# Patient Record
Sex: FEMALE | Race: WHITE | Employment: OTHER | URBAN - METROPOLITAN AREA
[De-identification: names, ages, dates, MRNs, and addresses within clinical notes are randomized per-mention and may not be internally consistent; named-entity substitution may affect disease eponyms.]

---

## 2017-04-04 ENCOUNTER — HOSPITAL ENCOUNTER (OUTPATIENT)
Dept: NON INVASIVE DIAGNOSTICS | Age: 82
Discharge: HOME | End: 2017-04-04
Payer: MEDICARE

## 2017-04-04 DIAGNOSIS — I35.0 NONRHEUMATIC AORTIC VALVE STENOSIS: ICD-10-CM

## 2017-04-04 LAB
LV EF: 73 %
LVEF MODALITY: NORMAL

## 2017-04-04 PROCEDURE — 93306 TTE W/DOPPLER COMPLETE: CPT

## 2017-07-06 ENCOUNTER — HOSPITAL ENCOUNTER (OUTPATIENT)
Age: 82
Setting detail: OUTPATIENT SURGERY
Discharge: HOME | End: 2017-07-06
Attending: SURGERY | Admitting: SURGERY
Payer: MEDICARE

## 2017-07-06 ENCOUNTER — ANESTHESIA EVENT (OUTPATIENT)
Dept: OPERATING ROOM | Age: 82
End: 2017-07-06
Payer: MEDICARE

## 2017-07-06 ENCOUNTER — ANESTHESIA (OUTPATIENT)
Dept: OPERATING ROOM | Age: 82
End: 2017-07-06
Payer: MEDICARE

## 2017-07-06 VITALS
BODY MASS INDEX: 30.23 KG/M2 | SYSTOLIC BLOOD PRESSURE: 167 MMHG | OXYGEN SATURATION: 95 % | WEIGHT: 154 LBS | HEIGHT: 60 IN | TEMPERATURE: 96.9 F | HEART RATE: 84 BPM | DIASTOLIC BLOOD PRESSURE: 6 MMHG | RESPIRATION RATE: 16 BRPM

## 2017-07-06 VITALS — SYSTOLIC BLOOD PRESSURE: 170 MMHG | DIASTOLIC BLOOD PRESSURE: 72 MMHG | OXYGEN SATURATION: 98 %

## 2017-07-06 PROCEDURE — 3700000000 HC ANESTHESIA ATTENDED CARE: Performed by: SURGERY

## 2017-07-06 PROCEDURE — 7100000001 HC PACU RECOVERY - ADDTL 15 MIN: Performed by: SURGERY

## 2017-07-06 PROCEDURE — 7100000031 HC ASPR PHASE II RECOVERY - ADDTL 15 MIN: Performed by: SURGERY

## 2017-07-06 PROCEDURE — 88305 TISSUE EXAM BY PATHOLOGIST: CPT

## 2017-07-06 PROCEDURE — 2580000003 HC RX 258: Performed by: ANESTHESIOLOGY

## 2017-07-06 PROCEDURE — 7100000000 HC PACU RECOVERY - FIRST 15 MIN: Performed by: SURGERY

## 2017-07-06 PROCEDURE — 88342 IMHCHEM/IMCYTCHM 1ST ANTB: CPT

## 2017-07-06 PROCEDURE — 3609012400 HC EGD TRANSORAL BIOPSY SINGLE/MULTIPLE: Performed by: SURGERY

## 2017-07-06 PROCEDURE — 3700000001 HC ADD 15 MINUTES (ANESTHESIA): Performed by: SURGERY

## 2017-07-06 PROCEDURE — 2580000003 HC RX 258: Performed by: NURSE ANESTHETIST, CERTIFIED REGISTERED

## 2017-07-06 PROCEDURE — 2500000003 HC RX 250 WO HCPCS: Performed by: NURSE ANESTHETIST, CERTIFIED REGISTERED

## 2017-07-06 PROCEDURE — 7100000030 HC ASPR PHASE II RECOVERY - FIRST 15 MIN: Performed by: SURGERY

## 2017-07-06 PROCEDURE — 6360000002 HC RX W HCPCS: Performed by: NURSE ANESTHETIST, CERTIFIED REGISTERED

## 2017-07-06 RX ORDER — SODIUM CHLORIDE, SODIUM LACTATE, POTASSIUM CHLORIDE, CALCIUM CHLORIDE 600; 310; 30; 20 MG/100ML; MG/100ML; MG/100ML; MG/100ML
INJECTION, SOLUTION INTRAVENOUS CONTINUOUS PRN
Status: DISCONTINUED | OUTPATIENT
Start: 2017-07-06 | End: 2017-07-06 | Stop reason: SDUPTHER

## 2017-07-06 RX ORDER — LIDOCAINE HYDROCHLORIDE 10 MG/ML
INJECTION, SOLUTION EPIDURAL; INFILTRATION; INTRACAUDAL; PERINEURAL PRN
Status: DISCONTINUED | OUTPATIENT
Start: 2017-07-06 | End: 2017-07-06 | Stop reason: SDUPTHER

## 2017-07-06 RX ORDER — OXYCODONE HYDROCHLORIDE AND ACETAMINOPHEN 5; 325 MG/1; MG/1
2 TABLET ORAL PRN
Status: DISCONTINUED | OUTPATIENT
Start: 2017-07-06 | End: 2017-07-06 | Stop reason: HOSPADM

## 2017-07-06 RX ORDER — SODIUM CHLORIDE, SODIUM LACTATE, POTASSIUM CHLORIDE, CALCIUM CHLORIDE 600; 310; 30; 20 MG/100ML; MG/100ML; MG/100ML; MG/100ML
INJECTION, SOLUTION INTRAVENOUS CONTINUOUS
Status: DISCONTINUED | OUTPATIENT
Start: 2017-07-06 | End: 2017-07-06 | Stop reason: HOSPADM

## 2017-07-06 RX ORDER — OXYCODONE HYDROCHLORIDE AND ACETAMINOPHEN 5; 325 MG/1; MG/1
1 TABLET ORAL PRN
Status: DISCONTINUED | OUTPATIENT
Start: 2017-07-06 | End: 2017-07-06 | Stop reason: HOSPADM

## 2017-07-06 RX ORDER — DIPHENHYDRAMINE HYDROCHLORIDE 50 MG/ML
12.5 INJECTION INTRAMUSCULAR; INTRAVENOUS
Status: DISCONTINUED | OUTPATIENT
Start: 2017-07-06 | End: 2017-07-06 | Stop reason: HOSPADM

## 2017-07-06 RX ORDER — PROMETHAZINE HYDROCHLORIDE 25 MG/ML
6.25 INJECTION, SOLUTION INTRAMUSCULAR; INTRAVENOUS
Status: DISCONTINUED | OUTPATIENT
Start: 2017-07-06 | End: 2017-07-06 | Stop reason: HOSPADM

## 2017-07-06 RX ORDER — MEPERIDINE HYDROCHLORIDE 25 MG/ML
12.5 INJECTION INTRAMUSCULAR; INTRAVENOUS; SUBCUTANEOUS EVERY 5 MIN PRN
Status: DISCONTINUED | OUTPATIENT
Start: 2017-07-06 | End: 2017-07-06 | Stop reason: HOSPADM

## 2017-07-06 RX ORDER — PROPOFOL 10 MG/ML
INJECTION, EMULSION INTRAVENOUS CONTINUOUS PRN
Status: DISCONTINUED | OUTPATIENT
Start: 2017-07-06 | End: 2017-07-06 | Stop reason: SDUPTHER

## 2017-07-06 RX ORDER — LABETALOL HYDROCHLORIDE 5 MG/ML
5 INJECTION, SOLUTION INTRAVENOUS EVERY 10 MIN PRN
Status: DISCONTINUED | OUTPATIENT
Start: 2017-07-06 | End: 2017-07-06 | Stop reason: HOSPADM

## 2017-07-06 RX ADMIN — SODIUM CHLORIDE, POTASSIUM CHLORIDE, SODIUM LACTATE AND CALCIUM CHLORIDE: 600; 310; 30; 20 INJECTION, SOLUTION INTRAVENOUS at 10:28

## 2017-07-06 RX ADMIN — PROPOFOL 70 MCG/KG/MIN: 10 INJECTION, EMULSION INTRAVENOUS at 10:50

## 2017-07-06 RX ADMIN — LIDOCAINE HYDROCHLORIDE 50 MG: 10 INJECTION, SOLUTION EPIDURAL; INFILTRATION; INTRACAUDAL; PERINEURAL at 10:50

## 2017-07-06 RX ADMIN — SODIUM CHLORIDE, POTASSIUM CHLORIDE, SODIUM LACTATE AND CALCIUM CHLORIDE: 600; 310; 30; 20 INJECTION, SOLUTION INTRAVENOUS at 10:44

## 2017-07-06 ASSESSMENT — PAIN SCALES - GENERAL
PAINLEVEL_OUTOF10: 0

## 2017-07-06 ASSESSMENT — PAIN - FUNCTIONAL ASSESSMENT: PAIN_FUNCTIONAL_ASSESSMENT: 0-10

## 2017-07-07 LAB — SURGICAL PATHOLOGY REPORT: NORMAL

## 2017-10-12 ENCOUNTER — APPOINTMENT (OUTPATIENT)
Dept: GENERAL RADIOLOGY | Age: 82
End: 2017-10-12
Payer: MEDICARE

## 2017-10-12 ENCOUNTER — APPOINTMENT (OUTPATIENT)
Dept: CT IMAGING | Age: 82
End: 2017-10-12
Payer: MEDICARE

## 2017-10-12 ENCOUNTER — HOSPITAL ENCOUNTER (EMERGENCY)
Age: 82
Discharge: HOME | End: 2017-10-12
Attending: EMERGENCY MEDICINE
Payer: MEDICARE

## 2017-10-12 VITALS
HEIGHT: 60 IN | WEIGHT: 155 LBS | OXYGEN SATURATION: 95 % | BODY MASS INDEX: 30.43 KG/M2 | DIASTOLIC BLOOD PRESSURE: 76 MMHG | RESPIRATION RATE: 16 BRPM | SYSTOLIC BLOOD PRESSURE: 185 MMHG | HEART RATE: 82 BPM | TEMPERATURE: 97.7 F

## 2017-10-12 DIAGNOSIS — S01.81XA FACIAL LACERATION, INITIAL ENCOUNTER: ICD-10-CM

## 2017-10-12 DIAGNOSIS — S80.02XA CONTUSION OF LEFT KNEE, INITIAL ENCOUNTER: Primary | ICD-10-CM

## 2017-10-12 DIAGNOSIS — S90.32XA CONTUSION OF LEFT FOOT, INITIAL ENCOUNTER: ICD-10-CM

## 2017-10-12 DIAGNOSIS — S09.90XA HEAD INJURY, INITIAL ENCOUNTER: ICD-10-CM

## 2017-10-12 PROCEDURE — 73060 X-RAY EXAM OF HUMERUS: CPT

## 2017-10-12 PROCEDURE — 99284 EMERGENCY DEPT VISIT MOD MDM: CPT

## 2017-10-12 PROCEDURE — 73562 X-RAY EXAM OF KNEE 3: CPT

## 2017-10-12 PROCEDURE — 73630 X-RAY EXAM OF FOOT: CPT

## 2017-10-12 PROCEDURE — 70450 CT HEAD/BRAIN W/O DYE: CPT

## 2017-10-12 ASSESSMENT — ENCOUNTER SYMPTOMS
COLOR CHANGE: 0
VOMITING: 0
BACK PAIN: 0
RHINORRHEA: 0
EYE REDNESS: 0
NAUSEA: 0
SORE THROAT: 0
EYE DISCHARGE: 0
SHORTNESS OF BREATH: 0
DIARRHEA: 0
COUGH: 0

## 2017-10-12 ASSESSMENT — PAIN DESCRIPTION - LOCATION: LOCATION: LEG

## 2017-10-12 ASSESSMENT — PAIN DESCRIPTION - PAIN TYPE: TYPE: ACUTE PAIN

## 2017-10-12 ASSESSMENT — PAIN DESCRIPTION - ORIENTATION: ORIENTATION: LEFT

## 2017-10-12 ASSESSMENT — PAIN SCALES - GENERAL: PAINLEVEL_OUTOF10: 6

## 2017-10-12 NOTE — ED PROVIDER NOTES
[facility]  eMERGENCY dEPARTMENT eNCOUnter      Pt Name: Kassie Hill  MRN: [id number]  [place] 5/5/1927  Date of evaluation: 10/12/2017    CHIEF COMPLAINT       Chief Complaint   Patient presents with    Fall    Knee Pain     left knee    Foot Pain     left foot          HISTORY OF PRESENT ILLNESS    Kassie Hill is a 80 y.o. female who presents With complaints of a fall, small laceration to the left side of her head and pain in the left knee and left foot. Patient states that she was on a trip this evening, she was walking she caught her foot on a step and she tripped and fell, hitting her head on a table. She denies any loss of consciousness but did sustain a small laceration to the left forehead. She also complains of severe pain in the left knee and left foot as well as some pain in the right humerus. Patient describes her symptoms as severe. She denies any nausea or vomiting, has no headache, denies any blood thinner use. She described her symptoms as severe. REVIEW OF SYSTEMS         Review of Systems   Constitutional: Negative for chills and fever. HENT: Negative for rhinorrhea and sore throat. Eyes: Negative for discharge, redness and visual disturbance. Respiratory: Negative for cough and shortness of breath. Cardiovascular: Negative for chest pain, palpitations and leg swelling. Gastrointestinal: Negative for diarrhea, nausea and vomiting. Genitourinary: Negative for dysuria and hematuria. Musculoskeletal: Negative for arthralgias, back pain, myalgias and neck pain. Right knee pain, left knee pain, left foot pain   Skin: Positive for wound. Negative for color change and rash. Neurological: Negative for seizures, weakness and headaches. Psychiatric/Behavioral: Negative for hallucinations, self-injury and suicidal ideas.           PAST MEDICAL HISTORY    has a past medical history of Anemia; Blood transfusion reaction; Diverticulosis; Edema of status of her brother is unknown. She indicated that the status of her other is unknown.      family history includes Heart Disease in an other family member; Hypertension in her brother; Other in her father. SOCIAL HISTORY      reports that she has never smoked. She has never used smokeless tobacco. She reports that she drinks alcohol. She reports that she does not use drugs. PHYSICAL EXAM     INITIAL VITALS:  height is 5' (1.524 m) and weight is 155 lb (70.3 kg). Her oral temperature is 97.7 °F (36.5 °C). Her blood pressure is 185/76 (abnormal) and her pulse is 82. Her respiration is 16 and oxygen saturation is 95%. Physical Exam   Constitutional: She is oriented to person, place, and time. She appears well-developed and well-nourished. Non-toxic appearance. She does not appear ill. HENT:   Head: Normocephalic. Head is with contusion and with laceration. Head is without Botello's sign. Right Ear: Tympanic membrane normal.   Eyes: Conjunctivae and EOM are normal. Pupils are equal, round, and reactive to light. Neck: Trachea normal and normal range of motion. Neck supple. No spinous process tenderness and no muscular tenderness present. Cardiovascular: Normal rate, regular rhythm, S1 normal and S2 normal.    No murmur heard. Pulmonary/Chest: Effort normal and breath sounds normal. No accessory muscle usage. No respiratory distress. She exhibits no tenderness and no deformity. Abdominal: Normal appearance and bowel sounds are normal. She exhibits no distension, no abdominal bruit and no ascites. There is no tenderness. There is no rigidity, no rebound and no guarding. Musculoskeletal:        Right knee: She exhibits normal range of motion, no swelling, no deformity and no laceration. Left knee: She exhibits decreased range of motion, swelling, effusion and bony tenderness. She exhibits no deformity and no laceration. Tenderness found.  Medial joint line and lateral joint line tenderness noted. Legs:  Neurological: She is alert and oriented to person, place, and time. She has normal strength. No cranial nerve deficit or sensory deficit. GCS eye subscore is 4. GCS verbal subscore is 5. GCS motor subscore is 6. Skin: Skin is warm. No rash noted. Psychiatric: She has a normal mood and affect. Her speech is normal.       DIFFERENTIAL DIAGNOSIS/MDM:   49-year-old female presents with left and right knee pain, left foot pain and a minor skin tear to the left face, we will obtain CT brain, x-rays of the knees bilaterally, the left foot and an x-ray of the right humerus. DIAGNOSTIC RESULTS     EKG: All EKG's are interpreted by the Emergency Department Physician who either signs or Co-signs this chart in the absence of a cardiologist.    Not indicated    RADIOLOGY:   I directly visualized the following  images and reviewed the radiologist interpretations:  XR HUMERUS RIGHT (MIN 2 VIEWS)   Final Result   No acute findings. XR KNEE LEFT (3 VIEWS)   Final Result   Advanced osteoarthrosis. No acute findings. XR KNEE RIGHT (3 VIEWS)   Final Result   No acute abnormality of the knee. XR FOOT LEFT (MIN 3 VIEWS)   Final Result   No acute osseous abnormality. CT Head WO Contrast   Final Result   No acute intracranial abnormality. Senescent changes. LABS:  Labs Reviewed - No data to display          EMERGENCY DEPARTMENT COURSE:   Vitals:    Vitals:    10/12/17 0042   BP: (!) 185/76   Pulse: 82   Resp: 16   Temp: 97.7 °F (36.5 °C)   TempSrc: Oral   SpO2: 95%   Weight: 155 lb (70.3 kg)   Height: 5' (1.524 m)     2:43 AM  Patient x rays and CT negative, Patient was ambulated around the emergency department with a 4. Cane and she did well. We will attempt to provide her with a new cane prescription, outpatient follow-up with primary care and return if symptoms worsen or change.     CRITICAL CARE:      CONSULTS:  None      PROCEDURES:      FINAL IMPRESSION      1. Contusion of left knee, initial encounter    2. Contusion of left foot, initial encounter    3. Head injury, initial encounter    4.  Facial laceration, initial encounter          DISPOSITION/PLAN   DISPOSITION     DC home    PATIENT REFERRED TO:  Waqas Ray [address] [place] [address]  [phone]    Schedule an appointment as soon as possible for a visit in 1 day        DISCHARGE MEDICATIONS:  New Prescriptions    No medications on file       (Please note that portions of this note were completed with a voice recognition program.  Efforts were made to edit the dictations but occasionally words are mis-transcribed.)    Karla Lovell  Emergency Medicine                 Karla Lovell MD  10/12/17 1705

## 2017-10-12 NOTE — ED NOTES
Pt presents to ED with complaint of falling while walking down stairs on a bus trip in [place] this evening. Pt denies any LOC, pt does have small skin tear to left temple and some slight swelling to right upper lip. Pt has large area of bruising to right upper arm. Left knee is swollen to approx 2x size of right knee, pt rates pain 6/10 at rest and 10/10 when standing. Pt unable to freely bend left knee. Pt also complaining of pain to top of left foot. Pt is AOx4 and answering questions appropriately. Pt denies taking any blood thinners at home, son is with pt at bedside.       Eladia Melo RN  10/12/17 2179

## 2017-10-13 ENCOUNTER — HOSPITAL ENCOUNTER (EMERGENCY)
Age: 82
Discharge: HOME | End: 2017-10-13
Attending: EMERGENCY MEDICINE
Payer: MEDICARE

## 2017-10-13 ENCOUNTER — APPOINTMENT (OUTPATIENT)
Dept: CT IMAGING | Age: 82
End: 2017-10-13
Payer: MEDICARE

## 2017-10-13 VITALS
DIASTOLIC BLOOD PRESSURE: 87 MMHG | SYSTOLIC BLOOD PRESSURE: 154 MMHG | BODY MASS INDEX: 29.45 KG/M2 | RESPIRATION RATE: 16 BRPM | OXYGEN SATURATION: 95 % | WEIGHT: 150 LBS | HEART RATE: 83 BPM | TEMPERATURE: 98.2 F | HEIGHT: 60 IN

## 2017-10-13 DIAGNOSIS — S80.02XA CONTUSION OF LEFT KNEE, INITIAL ENCOUNTER: Primary | ICD-10-CM

## 2017-10-13 PROCEDURE — 73700 CT LOWER EXTREMITY W/O DYE: CPT

## 2017-10-13 PROCEDURE — 6370000000 HC RX 637 (ALT 250 FOR IP): Performed by: EMERGENCY MEDICINE

## 2017-10-13 PROCEDURE — 99284 EMERGENCY DEPT VISIT MOD MDM: CPT

## 2017-10-13 RX ORDER — HYDROCODONE BITARTRATE AND ACETAMINOPHEN 5; 325 MG/1; MG/1
1 TABLET ORAL ONCE
Status: COMPLETED | OUTPATIENT
Start: 2017-10-13 | End: 2017-10-13

## 2017-10-13 RX ORDER — HYDROCODONE BITARTRATE AND ACETAMINOPHEN 5; 325 MG/1; MG/1
1 TABLET ORAL EVERY 6 HOURS PRN
Qty: 10 TABLET | Refills: 0 | Status: SHIPPED | OUTPATIENT
Start: 2017-10-13 | End: 2017-10-20

## 2017-10-13 RX ADMIN — HYDROCODONE BITARTRATE AND ACETAMINOPHEN 1 TABLET: 5; 325 TABLET ORAL at 11:03

## 2017-10-13 ASSESSMENT — ENCOUNTER SYMPTOMS
ABDOMINAL PAIN: 0
EYE REDNESS: 0
SHORTNESS OF BREATH: 0
BACK PAIN: 0
COUGH: 0
RHINORRHEA: 0
EYE PAIN: 0
VOMITING: 0
NAUSEA: 0

## 2017-10-13 ASSESSMENT — PAIN DESCRIPTION - DESCRIPTORS: DESCRIPTORS: CONSTANT

## 2017-10-13 ASSESSMENT — PAIN DESCRIPTION - LOCATION: LOCATION: KNEE

## 2017-10-13 ASSESSMENT — PAIN SCALES - GENERAL
PAINLEVEL_OUTOF10: 10
PAINLEVEL_OUTOF10: 10

## 2017-10-13 ASSESSMENT — PAIN DESCRIPTION - PAIN TYPE: TYPE: ACUTE PAIN

## 2017-10-13 ASSESSMENT — PAIN DESCRIPTION - ORIENTATION: ORIENTATION: LEFT

## 2017-10-13 NOTE — ED NOTES
Pt requests placement in ecf at [facility] of [address] [place] [address]. [address] [place] [address] contacted with paperwork faxed. Pt admiited to ecf, discharged from ed and transported to [address] [place] [address] per son in private vehicle.       Rosy Clinton RN  10/13/17 9190

## 2017-10-13 NOTE — ED PROVIDER NOTES
SENNOSIDES-DOCUSATE SODIUM (SENOKOT-S) 8.6-50 MG TABLET    Take 2 tablets by mouth nightly as needed for Constipation. ALLERGIES     Ace inhibitors and Sporanox [itraconazole]    FAMILY HISTORY       Family Status   Relation Status    Father     Brother     Other       Family History   Problem Relation Age of Onset    Other Father      kidney dz, bright`s dz    Hypertension Brother      2 brothers. heart dz    Heart Disease Other        SOCIAL HISTORY       Social History     Social History    Marital status:      Spouse name: N/A    Number of children: N/A    Years of education: N/A     Occupational History    retired      Social History Main Topics    Smoking status: Never Smoker    Smokeless tobacco: Never Used    Alcohol use Yes      Comment: occasional    Drug use: No    Sexual activity: Not Asked     Other Topics Concern    None     Social History Narrative    None       PHYSICAL EXAM     INITIAL VITALS:  height is 5' (1.524 m) and weight is 150 lb (68 kg). Her oral temperature is 98.2 °F (36.8 °C). Her blood pressure is 154/87 (abnormal) and her pulse is 83. Her respiration is 16 and oxygen saturation is 95%. Physical Exam   Constitutional: She is oriented to person, place, and time. She appears well-developed and well-nourished. HENT:   Head: Normocephalic. Nose: Nose normal.   Mouth/Throat: Oropharynx is clear and moist.   Old bruising to the left periorbital region   Eyes: Conjunctivae and EOM are normal. Pupils are equal, round, and reactive to light. Neck: Normal range of motion. Neck supple. Cardiovascular: Normal rate, regular rhythm, normal heart sounds and intact distal pulses. Pulmonary/Chest: Effort normal and breath sounds normal.   Abdominal: Soft. Bowel sounds are normal.   Musculoskeletal: Normal range of motion. Left knee with some swelling and tenderness and limited range of motion due to pain. Distal neurovascular motor is intact.   Hips

## 2017-10-13 NOTE — ED TRIAGE NOTES
Pt fell 2 days ago. Pt was evaluated yesterday here. Pt states today L knee hurts so bad she cannot put weight on it. Pt denies SOB or CP.

## 2017-10-13 NOTE — ED NOTES
Bed: 12  Expected date:   Expected time:   Means of arrival: Lety Anderson  Comments:      Mark Hoffman RN  10/13/17 1021

## 2017-10-30 ENCOUNTER — CARE COORDINATION (OUTPATIENT)
Dept: CARE COORDINATION | Age: 82
End: 2017-10-30

## 2017-10-30 NOTE — CARE COORDINATION
I called and spoke with  Ale Herrera. She will be discharged from [facility] tomorrow with [facility]. I asked her to fax the [facility] list to [facility] at [phone]. I will call Alessio Irvin in a few days to follow.

## 2017-11-07 ENCOUNTER — CARE COORDINATION (OUTPATIENT)
Dept: CARE COORDINATION | Age: 82
End: 2017-11-07

## 2017-11-07 NOTE — CARE COORDINATION
Ambulatory Care Coordination Note  11/7/2017  CM Risk Score: 4  Donny Mortality Risk Score: 25.37    ACC: Romina Whiteside, EDU    Summary Note:  Called and left a voicemail message asking patient or family member to call me back at [phone] for care coordination call, check on home health visits with Layton Callow since her discharge from [facility] on 10/31/17. I called and spoke with Heather Carter at [facility]. Alexia Tam is receiving nursing, PT, OT and HHA visits. She lives by herself. She manages her medications by herself, has not had any falls since being home. Alexia Tam called back. She is doing well, continues to use her walker, feels safer that way and faster than hanging onto furniture. She has no pain any more, feels good, is not taking any pain medication. Is getting visits about twice a day from therapists, [facility] and nurse. She gets Meals on Wheels. Her son lives in [facility], he checks on her every day by phone or visits her. Full medication review not completed, stated is on the same meds as before. Denied any needs. I encouraged her to call me or call her home health nurse for any problems or concerns. General Assessment    Do you have any symptoms that are causing concern?:  No                 Care Coordination Interventions    Program Enrollment:  Rising Risk  Referral from Primary Care Provider:  Yes  Suggested Interventions and Community Resources         Goals Addressed     None          Prior to Admission medications    Medication Sig Start Date End Date Taking? Authorizing Provider   diclofenac sodium 1 % GEL Apply 2 g topically 2 times daily    Historical Provider, MD   HYDROcodone-acetaminophen (NORCO) 5-325 MG per tablet Take 1 tablet by mouth every 6 hours as needed for Pain . Historical Provider, MD   HYDROcodone-acetaminophen (NORCO)  MG per tablet Take 1 tablet by mouth every 6 hours as needed for Pain .     Historical Provider, MD   Cranberry-Vitamin C-Inulin (UTI-STAT) LIQD Take by mouth Historical Provider, MD   levothyroxine (SYNTHROID) 25 MCG tablet Take 1 tablet by mouth Daily 10/25/17   Stefanie West MD   losartan (COZAAR) 100 MG tablet Take 1 tablet by mouth daily 10/25/17   Stefanie West MD   amLODIPine (NORVASC) 5 MG tablet Take 1 tablet by mouth daily 10/25/17   Stefanie West MD   b complex vitamins capsule Take 1 capsule by mouth daily    Historical Provider, MD   acetaminophen 650 MG TABS Take 650 mg by mouth every 4 hours as needed. 7/9/14   Stefanie West MD   sennosides-docusate sodium (SENOKOT-S) 8.6-50 MG tablet Take 2 tablets by mouth nightly as needed for Constipation. 6/6/14   Stefanie West MD   melatonin 3 MG TABS tablet Take 3 mg by mouth nightly. Historical Provider, MD   multivitamin (OCUVITE) TABS per tablet Take 1 tablet by mouth daily.     Historical Provider, MD       Future Appointments  Date Time Provider Jame Delgado   12/1/2017 1:20 PM Stefanie West MD [facility] [address] [place] [address]

## 2017-11-14 ENCOUNTER — CARE COORDINATION (OUTPATIENT)
Dept: CARE COORDINATION | Age: 82
End: 2017-11-14

## 2017-12-08 ENCOUNTER — CARE COORDINATION (OUTPATIENT)
Dept: CARE COORDINATION | Age: 82
End: 2017-12-08

## 2017-12-08 NOTE — CARE COORDINATION
Ambulatory Care Coordination Note  12/8/2017  CM Risk Score: 4  Donny Mortality Risk Score: 25.37    ACC: Adryan Moraes, RN    Summary Note: [facility] [place] is feeling well. Sometimes gets uncomfortable from left leg injury but is able to walk ok, she just can't tolerate a lot of walking at one time, she has to take frequent breaks. No falls since she's been home. No shortness of breath or other pain, is eating ok. She was discharged from [facility] and therapy visits, is trying to continue exercises at home she learned from physical therapist. Had recent PCP appt, no new medications. Denied any needs at this time. General Assessment    Do you have any symptoms that are causing concern?:  No               Care Coordination Interventions    Program Enrollment:  Rising Risk  Referral from Primary Care Provider:  Yes  Suggested Interventions and Community Resources  Fall Risk Prevention: In Process  Home Health Services:  (Comment:  )  Meals on Wheels:  Completed  Occupational Therapy:  (Comment:  )  Physical Therapy:  (Comment:  )         Goals Addressed             Most Recent     Reduce Falls    On track (12/8/2017)             I will reduce my risk of falls by the following: Remove rugs or use non slip rugs  Install grab bars in bathroom  Use walking aids like cane or walker  Follow through on orders for PT    Barriers: none  Plan for overcoming my barriers: N/A  Confidence: 9/10  Anticipated Goal Completion Date: one month            Prior to Admission medications    Medication Sig Start Date End Date Taking? Authorizing Provider   diclofenac sodium 1 % GEL Apply 2 g topically 2 times daily 11/13/17   Alena Orantes MD   HYDROcodone-acetaminophen (NORCO) 5-325 MG per tablet Take 1 tablet by mouth every 6 hours as needed for Pain . Historical Provider, MD   HYDROcodone-acetaminophen (NORCO)  MG per tablet Take 1 tablet by mouth every 6 hours as needed for Pain .     Historical Provider, MD Cranberry-Vitamin C-Inulin (UTI-STAT) LIQD Take by mouth    Historical Provider, MD   levothyroxine (SYNTHROID) 25 MCG tablet Take 1 tablet by mouth Daily 10/25/17   Agustin Momin MD   losartan (COZAAR) 100 MG tablet Take 1 tablet by mouth daily 10/25/17   Agustin Momin MD   amLODIPine (NORVASC) 5 MG tablet Take 1 tablet by mouth daily 10/25/17   Agustin Momin MD   b complex vitamins capsule Take 1 capsule by mouth daily    Historical Provider, MD   acetaminophen 650 MG TABS Take 650 mg by mouth every 4 hours as needed. 7/9/14   Agustin Momin MD   sennosides-docusate sodium (SENOKOT-S) 8.6-50 MG tablet Take 2 tablets by mouth nightly as needed for Constipation. 6/6/14   Agustin Momin MD   melatonin 3 MG TABS tablet Take 3 mg by mouth nightly. Historical Provider, MD   multivitamin (OCUVITE) TABS per tablet Take 1 tablet by mouth daily.     Historical Provider, MD       Future Appointments  Date Time Provider Jame Delgado   3/8/2018 1:40 PM Agustin Momin MD [facility] [address] [place] [address]

## 2018-01-15 ENCOUNTER — CARE COORDINATION (OUTPATIENT)
Dept: CARE COORDINATION | Age: 83
End: 2018-01-15

## 2018-01-26 ENCOUNTER — CARE COORDINATION (OUTPATIENT)
Dept: CARE COORDINATION | Age: 83
End: 2018-01-26

## 2018-02-15 ENCOUNTER — CARE COORDINATION (OUTPATIENT)
Dept: CARE COORDINATION | Age: 83
End: 2018-02-15

## 2018-02-15 NOTE — CARE COORDINATION
Ambulatory Care Coordination Note  2/15/2018  CM Risk Score: 5  Donny Mortality Risk Score: 25.37    ACC: Nikki Moody RN    Summary Note: She reported she is feeling good. Is now using the Voltaren gel and she likes it. Moving around good, no falls. Denied any other issues or needs at this time. General Assessment    Do you have any symptoms that are causing concern?:  No               Care Coordination Interventions    Program Enrollment:  Rising Risk  Referral from Primary Care Provider:  Yes  Suggested Interventions and Community Resources  Fall Risk Prevention:  Completed  Home Health Services:  (Comment:  )  Meals on Wheels:  Completed  Medi Set or Pill Pack:  Declined  Occupational Therapy:  (Comment:  )  Physical Therapy:  (Comment:  )         Goals Addressed     None          Prior to Admission medications    Medication Sig Start Date End Date Taking? Authorizing Provider   diclofenac sodium 1 % GEL Apply 2 g topically 2 times daily 11/13/17   Isadora Rick MD   HYDROcodone-acetaminophen (NORCO) 5-325 MG per tablet Take 1 tablet by mouth every 6 hours as needed for Pain . Historical Provider, MD   HYDROcodone-acetaminophen (NORCO)  MG per tablet Take 1 tablet by mouth every 6 hours as needed for Pain . Historical Provider, MD   Cranberry-Vitamin C-Inulin (UTI-STAT) LIQD Take by mouth    Historical Provider, MD   levothyroxine (SYNTHROID) 25 MCG tablet Take 1 tablet by mouth Daily 10/25/17   Isadora Rick MD   losartan (COZAAR) 100 MG tablet Take 1 tablet by mouth daily 10/25/17   Isadora Rick MD   amLODIPine (NORVASC) 5 MG tablet Take 1 tablet by mouth daily 10/25/17   Isadora Rick MD   b complex vitamins capsule Take 1 capsule by mouth daily    Historical Provider, MD   acetaminophen 650 MG TABS Take 650 mg by mouth every 4 hours as needed.  7/9/14   Isadora Rick MD   sennosides-docusate sodium (SENOKOT-S) 8.6-50 MG tablet Take 2 tablets by mouth nightly as needed for Constipation. 6/6/14   Pedro Gregory MD   melatonin 3 MG TABS tablet Take 3 mg by mouth nightly. Historical Provider, MD   multivitamin (OCUVITE) TABS per tablet Take 1 tablet by mouth daily.     Historical Provider, MD       Future Appointments  Date Time Provider Jame Sandy   3/8/2018 1:40 PM Pedro Gregory MD [address]

## 2018-03-08 ENCOUNTER — CARE COORDINATION (OUTPATIENT)
Dept: CARE COORDINATION | Age: 83
End: 2018-03-08

## 2018-03-08 ASSESSMENT — PATIENT HEALTH QUESTIONNAIRE - PHQ9
8. MOVING OR SPEAKING SO SLOWLY THAT OTHER PEOPLE COULD HAVE NOTICED. OR THE OPPOSITE, BEING SO FIGETY OR RESTLESS THAT YOU HAVE BEEN MOVING AROUND A LOT MORE THAN USUAL: 0
4. FEELING TIRED OR HAVING LITTLE ENERGY: 0
7. TROUBLE CONCENTRATING ON THINGS, SUCH AS READING THE NEWSPAPER OR WATCHING TELEVISION: 0
5. POOR APPETITE OR OVEREATING: 0
SUM OF ALL RESPONSES TO PHQ9 QUESTIONS 1 & 2: 0
9. THOUGHTS THAT YOU WOULD BE BETTER OFF DEAD, OR OF HURTING YOURSELF: 0
SUM OF ALL RESPONSES TO PHQ QUESTIONS 1-9: 1
3. TROUBLE FALLING OR STAYING ASLEEP: 1
6. FEELING BAD ABOUT YOURSELF - OR THAT YOU ARE A FAILURE OR HAVE LET YOURSELF OR YOUR FAMILY DOWN: 0
10. IF YOU CHECKED OFF ANY PROBLEMS, HOW DIFFICULT HAVE THESE PROBLEMS MADE IT FOR YOU TO DO YOUR WORK, TAKE CARE OF THINGS AT HOME, OR GET ALONG WITH OTHER PEOPLE: 0
1. LITTLE INTEREST OR PLEASURE IN DOING THINGS: 0
2. FEELING DOWN, DEPRESSED OR HOPELESS: 0

## 2018-03-08 NOTE — CARE COORDINATION
Ambulatory Care Coordination Note  3/8/2018  CM Risk Score: 5  Donny Mortality Risk Score: 25.37    ACC: Margarito Corcoran RN    Summary Note: Bubba Fitzpatrick while at office visit. She has been doing well. Is using her cane at all times now, is afraid of another fall, has not had any more. She is feeling good, using the Voltaren gel which helps her pain. She had left carpal tunnel surgery which did not completely resolve her pain issue in left hand, still has some weakness. She wears a splint which helps. No issues or concerns. She will get her yearly labs done soon. Denied any needs right now. General Assessment    Do you have any symptoms that are causing concern?:  Yes  Progression since Onset:  Unchanged  Reported Symptoms:  Pain (Comment: left wrist/hand discomfort from carpal tunnel)               Care Coordination Interventions    Program Enrollment:  Rising Risk  Referral from Primary Care Provider:  Yes  Suggested Interventions and Community Resources  Fall Risk Prevention:  Completed  Home Health Services:  (Comment:  )  Meals on Wheels:  Completed  Medi Set or Pill Pack:  Declined  Occupational Therapy:  (Comment:  )  Physical Therapy:  (Comment:  )         Goals Addressed             Most Recent     Reduce Falls    On track (3/8/2018)             I will reduce my risk of falls by the following: Remove rugs or use non slip rugs  Install grab bars in bathroom  Use walking aids like cane or walker  Follow through on orders for PT    Barriers: none  Plan for overcoming my barriers: N/A  Confidence: 9/10  Anticipated Goal Completion Date: one month            Prior to Admission medications    Medication Sig Start Date End Date Taking?  Authorizing Provider   triamcinolone (KENALOG) 0.1 % ointment Apply 1 each topically 2 times daily 1/19/18   Historical Provider, MD   diclofenac sodium 1 % GEL Apply 2 g topically 2 times daily 11/13/17   Dulce Wilder MD   Cranberry-Vitamin C-Inulin (UTI-STAT) LIQD Take by mouth    Historical Provider, MD   levothyroxine (SYNTHROID) 25 MCG tablet Take 1 tablet by mouth Daily 10/25/17   Aracelis Arcos MD   losartan (COZAAR) 100 MG tablet Take 1 tablet by mouth daily 10/25/17   Aracelis Arcos MD   amLODIPine (NORVASC) 5 MG tablet Take 1 tablet by mouth daily 10/25/17   Aracelis Arcos MD   b complex vitamins capsule Take 1 capsule by mouth daily    Historical Provider, MD   acetaminophen 650 MG TABS Take 650 mg by mouth every 4 hours as needed. 7/9/14   Aracelis Arcos MD   sennosides-docusate sodium (SENOKOT-S) 8.6-50 MG tablet Take 2 tablets by mouth nightly as needed for Constipation. 6/6/14   Aracelis Arcos MD   melatonin 3 MG TABS tablet Take 3 mg by mouth nightly. Historical Provider, MD   multivitamin (OCUVITE) TABS per tablet Take 1 tablet by mouth daily.     Historical Provider, MD       Future Appointments  Date Time Provider Jame Delgado   6/14/2018 1:40 PM Aracelis Arcos MD [facility] [address] [place] [address]

## 2018-04-03 ENCOUNTER — CARE COORDINATION (OUTPATIENT)
Dept: CARE COORDINATION | Age: 83
End: 2018-04-03

## 2018-05-01 ENCOUNTER — CARE COORDINATION (OUTPATIENT)
Dept: CARE COORDINATION | Age: 83
End: 2018-05-01

## 2018-05-02 ENCOUNTER — HOSPITAL ENCOUNTER (EMERGENCY)
Age: 83
Discharge: HOME | End: 2018-05-02
Attending: EMERGENCY MEDICINE
Payer: MEDICARE

## 2018-05-02 ENCOUNTER — APPOINTMENT (OUTPATIENT)
Dept: CT IMAGING | Age: 83
End: 2018-05-02
Payer: MEDICARE

## 2018-05-02 VITALS
BODY MASS INDEX: 28.47 KG/M2 | HEART RATE: 92 BPM | WEIGHT: 145 LBS | HEIGHT: 60 IN | RESPIRATION RATE: 19 BRPM | OXYGEN SATURATION: 93 % | DIASTOLIC BLOOD PRESSURE: 95 MMHG | SYSTOLIC BLOOD PRESSURE: 165 MMHG | TEMPERATURE: 98.7 F

## 2018-05-02 DIAGNOSIS — R19.7 DIARRHEA, UNSPECIFIED TYPE: Primary | ICD-10-CM

## 2018-05-02 LAB
ABSOLUTE EOS #: 0 K/UL (ref 0–0.4)
ABSOLUTE IMMATURE GRANULOCYTE: ABNORMAL K/UL (ref 0–0.3)
ABSOLUTE LYMPH #: 0.5 K/UL (ref 1–4.8)
ABSOLUTE MONO #: 0.8 K/UL (ref 0.1–1.3)
ALBUMIN SERPL-MCNC: 3.7 G/DL (ref 3.5–5.2)
ALBUMIN/GLOBULIN RATIO: ABNORMAL (ref 1–2.5)
ALP BLD-CCNC: 56 U/L (ref 35–104)
ALT SERPL-CCNC: 15 U/L (ref 5–33)
ANION GAP SERPL CALCULATED.3IONS-SCNC: 15 MMOL/L (ref 9–17)
AST SERPL-CCNC: 21 U/L
BASOPHILS # BLD: 0 % (ref 0–2)
BASOPHILS ABSOLUTE: 0 K/UL (ref 0–0.2)
BILIRUB SERPL-MCNC: 0.28 MG/DL (ref 0.3–1.2)
BUN BLDV-MCNC: 18 MG/DL (ref 8–23)
BUN/CREAT BLD: ABNORMAL (ref 9–20)
CALCIUM SERPL-MCNC: 8.6 MG/DL (ref 8.6–10.4)
CHLORIDE BLD-SCNC: 101 MMOL/L (ref 98–107)
CO2: 21 MMOL/L (ref 20–31)
CREAT SERPL-MCNC: 0.68 MG/DL (ref 0.5–0.9)
DIFFERENTIAL TYPE: ABNORMAL
EOSINOPHILS RELATIVE PERCENT: 0 % (ref 0–4)
GFR AFRICAN AMERICAN: >60 ML/MIN
GFR NON-AFRICAN AMERICAN: >60 ML/MIN
GFR SERPL CREATININE-BSD FRML MDRD: ABNORMAL ML/MIN/{1.73_M2}
GFR SERPL CREATININE-BSD FRML MDRD: ABNORMAL ML/MIN/{1.73_M2}
GLUCOSE BLD-MCNC: 97 MG/DL (ref 70–99)
HCT VFR BLD CALC: 31.7 % (ref 36–46)
HEMOGLOBIN: 10.6 G/DL (ref 12–16)
IMMATURE GRANULOCYTES: ABNORMAL %
LACTIC ACID: 1.6 MMOL/L (ref 0.5–2.2)
LIPASE: 8 U/L (ref 13–60)
LYMPHOCYTES # BLD: 8 % (ref 24–44)
MCH RBC QN AUTO: 29.9 PG (ref 26–34)
MCHC RBC AUTO-ENTMCNC: 33.4 G/DL (ref 31–37)
MCV RBC AUTO: 89.6 FL (ref 80–100)
MONOCYTES # BLD: 12 % (ref 1–7)
NRBC AUTOMATED: ABNORMAL PER 100 WBC
PDW BLD-RTO: 15.3 % (ref 11.5–14.9)
PLATELET # BLD: 229 K/UL (ref 150–450)
PLATELET ESTIMATE: ABNORMAL
PMV BLD AUTO: 7.7 FL (ref 6–12)
POTASSIUM SERPL-SCNC: 3.5 MMOL/L (ref 3.7–5.3)
RBC # BLD: 3.54 M/UL (ref 4–5.2)
RBC # BLD: ABNORMAL 10*6/UL
SEG NEUTROPHILS: 80 % (ref 36–66)
SEGMENTED NEUTROPHILS ABSOLUTE COUNT: 5.2 K/UL (ref 1.3–9.1)
SODIUM BLD-SCNC: 137 MMOL/L (ref 135–144)
TOTAL PROTEIN: 7.2 G/DL (ref 6.4–8.3)
WBC # BLD: 6.5 K/UL (ref 3.5–11)
WBC # BLD: ABNORMAL 10*3/UL

## 2018-05-02 PROCEDURE — 2580000003 HC RX 258: Performed by: EMERGENCY MEDICINE

## 2018-05-02 PROCEDURE — 85025 COMPLETE CBC W/AUTO DIFF WBC: CPT

## 2018-05-02 PROCEDURE — 96374 THER/PROPH/DIAG INJ IV PUSH: CPT

## 2018-05-02 PROCEDURE — 36415 COLL VENOUS BLD VENIPUNCTURE: CPT

## 2018-05-02 PROCEDURE — 6360000002 HC RX W HCPCS: Performed by: EMERGENCY MEDICINE

## 2018-05-02 PROCEDURE — 6360000004 HC RX CONTRAST MEDICATION: Performed by: EMERGENCY MEDICINE

## 2018-05-02 PROCEDURE — 80053 COMPREHEN METABOLIC PANEL: CPT

## 2018-05-02 PROCEDURE — 83605 ASSAY OF LACTIC ACID: CPT

## 2018-05-02 PROCEDURE — 83690 ASSAY OF LIPASE: CPT

## 2018-05-02 PROCEDURE — 99284 EMERGENCY DEPT VISIT MOD MDM: CPT

## 2018-05-02 PROCEDURE — 74177 CT ABD & PELVIS W/CONTRAST: CPT

## 2018-05-02 PROCEDURE — 6370000000 HC RX 637 (ALT 250 FOR IP): Performed by: EMERGENCY MEDICINE

## 2018-05-02 RX ORDER — 0.9 % SODIUM CHLORIDE 0.9 %
100 INTRAVENOUS SOLUTION INTRAVENOUS ONCE
Status: COMPLETED | OUTPATIENT
Start: 2018-05-02 | End: 2018-05-02

## 2018-05-02 RX ORDER — 0.9 % SODIUM CHLORIDE 0.9 %
1000 INTRAVENOUS SOLUTION INTRAVENOUS ONCE
Status: COMPLETED | OUTPATIENT
Start: 2018-05-02 | End: 2018-05-02

## 2018-05-02 RX ORDER — SODIUM CHLORIDE 0.9 % (FLUSH) 0.9 %
10 SYRINGE (ML) INJECTION PRN
Status: DISCONTINUED | OUTPATIENT
Start: 2018-05-02 | End: 2018-05-02 | Stop reason: HOSPADM

## 2018-05-02 RX ORDER — ONDANSETRON 2 MG/ML
4 INJECTION INTRAMUSCULAR; INTRAVENOUS ONCE
Status: COMPLETED | OUTPATIENT
Start: 2018-05-02 | End: 2018-05-02

## 2018-05-02 RX ORDER — POTASSIUM CHLORIDE 20 MEQ/1
20 TABLET, EXTENDED RELEASE ORAL ONCE
Status: COMPLETED | OUTPATIENT
Start: 2018-05-02 | End: 2018-05-02

## 2018-05-02 RX ADMIN — IOPAMIDOL 75 ML: 755 INJECTION, SOLUTION INTRAVENOUS at 18:11

## 2018-05-02 RX ADMIN — ONDANSETRON 4 MG: 2 INJECTION INTRAMUSCULAR; INTRAVENOUS at 17:34

## 2018-05-02 RX ADMIN — SODIUM CHLORIDE 100 ML: 9 INJECTION, SOLUTION INTRAVENOUS at 18:12

## 2018-05-02 RX ADMIN — POTASSIUM CHLORIDE 20 MEQ: 20 TABLET, EXTENDED RELEASE ORAL at 17:51

## 2018-05-02 RX ADMIN — Medication 10 ML: at 18:11

## 2018-05-02 RX ADMIN — SODIUM CHLORIDE 1000 ML: 9 INJECTION, SOLUTION INTRAVENOUS at 17:34

## 2018-05-02 ASSESSMENT — ENCOUNTER SYMPTOMS
SHORTNESS OF BREATH: 0
BACK PAIN: 0
ABDOMINAL PAIN: 1
RESPIRATORY NEGATIVE: 1
EYES NEGATIVE: 1
DIARRHEA: 1
RECENT COUGH: 0
VOMITING: 0
COUGH: 0

## 2018-05-04 ENCOUNTER — CARE COORDINATION (OUTPATIENT)
Dept: CARE COORDINATION | Age: 83
End: 2018-05-04

## 2018-05-07 ENCOUNTER — CARE COORDINATION (OUTPATIENT)
Dept: CARE COORDINATION | Age: 83
End: 2018-05-07

## 2018-05-08 ENCOUNTER — HOSPITAL ENCOUNTER (OUTPATIENT)
Age: 83
Setting detail: SPECIMEN
Discharge: HOME | End: 2018-05-08
Payer: MEDICARE

## 2018-05-08 LAB
C DIFFICILE TOXINS, PCR: ABNORMAL
SPECIMEN DESCRIPTION: ABNORMAL

## 2018-05-09 LAB
CAMPYLOBACTER PCR: NORMAL
SALMONELLA PCR: NORMAL
SHIGATOXIN GENE PCR: NORMAL
SHIGELLA SP PCR: NORMAL
SPECIMEN: NORMAL

## 2018-05-14 ENCOUNTER — CARE COORDINATION (OUTPATIENT)
Dept: CARE COORDINATION | Age: 83
End: 2018-05-14

## 2018-05-15 ENCOUNTER — HOSPITAL ENCOUNTER (OUTPATIENT)
Age: 83
Setting detail: SPECIMEN
Discharge: HOME | End: 2018-05-15
Payer: MEDICARE

## 2018-05-15 DIAGNOSIS — E78.2 MIXED HYPERLIPIDEMIA: ICD-10-CM

## 2018-05-15 DIAGNOSIS — E03.9 ACQUIRED HYPOTHYROIDISM: ICD-10-CM

## 2018-05-15 DIAGNOSIS — I10 ESSENTIAL HYPERTENSION: ICD-10-CM

## 2018-05-15 LAB
ABSOLUTE EOS #: 0.12 K/UL (ref 0–0.44)
ABSOLUTE IMMATURE GRANULOCYTE: 0.05 K/UL (ref 0–0.3)
ABSOLUTE LYMPH #: 1.22 K/UL (ref 1.1–3.7)
ABSOLUTE MONO #: 0.47 K/UL (ref 0.1–1.2)
ALBUMIN SERPL-MCNC: 3.3 G/DL (ref 3.5–5.2)
ALBUMIN/GLOBULIN RATIO: 1.1 (ref 1–2.5)
ALP BLD-CCNC: 52 U/L (ref 35–104)
ALT SERPL-CCNC: 10 U/L (ref 5–33)
ANION GAP SERPL CALCULATED.3IONS-SCNC: 13 MMOL/L (ref 9–17)
AST SERPL-CCNC: 12 U/L
BASOPHILS # BLD: 0 % (ref 0–2)
BASOPHILS ABSOLUTE: 0.03 K/UL (ref 0–0.2)
BILIRUB SERPL-MCNC: 0.23 MG/DL (ref 0.3–1.2)
BUN BLDV-MCNC: 11 MG/DL (ref 8–23)
BUN/CREAT BLD: ABNORMAL (ref 9–20)
CALCIUM SERPL-MCNC: 8.5 MG/DL (ref 8.6–10.4)
CHLORIDE BLD-SCNC: 105 MMOL/L (ref 98–107)
CHOLESTEROL, FASTING: 120 MG/DL
CHOLESTEROL/HDL RATIO: 3.1
CO2: 27 MMOL/L (ref 20–31)
CREAT SERPL-MCNC: 0.57 MG/DL (ref 0.5–0.9)
DIFFERENTIAL TYPE: ABNORMAL
EOSINOPHILS RELATIVE PERCENT: 2 % (ref 1–4)
GFR AFRICAN AMERICAN: >60 ML/MIN
GFR NON-AFRICAN AMERICAN: >60 ML/MIN
GFR SERPL CREATININE-BSD FRML MDRD: ABNORMAL ML/MIN/{1.73_M2}
GFR SERPL CREATININE-BSD FRML MDRD: ABNORMAL ML/MIN/{1.73_M2}
GLUCOSE FASTING: 96 MG/DL (ref 70–99)
HCT VFR BLD CALC: 33.9 % (ref 36.3–47.1)
HDLC SERPL-MCNC: 39 MG/DL
HEMOGLOBIN: 10.5 G/DL (ref 11.9–15.1)
IMMATURE GRANULOCYTES: 1 %
LDL CHOLESTEROL: 60 MG/DL (ref 0–130)
LYMPHOCYTES # BLD: 17 % (ref 24–43)
MCH RBC QN AUTO: 28.2 PG (ref 25.2–33.5)
MCHC RBC AUTO-ENTMCNC: 31 G/DL (ref 28.4–34.8)
MCV RBC AUTO: 91.1 FL (ref 82.6–102.9)
MONOCYTES # BLD: 6 % (ref 3–12)
NRBC AUTOMATED: 0 PER 100 WBC
PDW BLD-RTO: 15.1 % (ref 11.8–14.4)
PLATELET # BLD: 384 K/UL (ref 138–453)
PLATELET ESTIMATE: ABNORMAL
PMV BLD AUTO: 9.8 FL (ref 8.1–13.5)
POTASSIUM SERPL-SCNC: 3.6 MMOL/L (ref 3.7–5.3)
RBC # BLD: 3.72 M/UL (ref 3.95–5.11)
RBC # BLD: ABNORMAL 10*6/UL
SEG NEUTROPHILS: 74 % (ref 36–65)
SEGMENTED NEUTROPHILS ABSOLUTE COUNT: 5.43 K/UL (ref 1.5–8.1)
SODIUM BLD-SCNC: 145 MMOL/L (ref 135–144)
TOTAL PROTEIN: 6.4 G/DL (ref 6.4–8.3)
TRIGLYCERIDE, FASTING: 106 MG/DL
TSH SERPL DL<=0.05 MIU/L-ACNC: 2.69 MIU/L (ref 0.3–5)
VLDLC SERPL CALC-MCNC: ABNORMAL MG/DL (ref 1–30)
WBC # BLD: 7.3 K/UL (ref 3.5–11.3)
WBC # BLD: ABNORMAL 10*3/UL

## 2018-05-22 ENCOUNTER — HOSPITAL ENCOUNTER (OUTPATIENT)
Dept: GENERAL RADIOLOGY | Age: 83
Discharge: HOME | End: 2018-05-24
Payer: MEDICARE

## 2018-05-22 ENCOUNTER — HOSPITAL ENCOUNTER (OUTPATIENT)
Age: 83
Discharge: HOME | End: 2018-05-22
Payer: MEDICARE

## 2018-05-22 ENCOUNTER — HOSPITAL ENCOUNTER (OUTPATIENT)
Age: 83
Discharge: HOME | End: 2018-05-24
Payer: MEDICARE

## 2018-05-22 DIAGNOSIS — A04.72 C. DIFFICILE DIARRHEA: ICD-10-CM

## 2018-05-22 DIAGNOSIS — I10 ESSENTIAL HYPERTENSION: ICD-10-CM

## 2018-05-22 DIAGNOSIS — R60.0 LOCALIZED EDEMA: ICD-10-CM

## 2018-05-22 DIAGNOSIS — R06.02 SOB (SHORTNESS OF BREATH): ICD-10-CM

## 2018-05-22 LAB
BNP INTERPRETATION: ABNORMAL
D-DIMER QUANTITATIVE: 0.96 MG/L FEU
PRO-BNP: 419 PG/ML

## 2018-05-22 PROCEDURE — 36415 COLL VENOUS BLD VENIPUNCTURE: CPT

## 2018-05-22 PROCEDURE — 71046 X-RAY EXAM CHEST 2 VIEWS: CPT

## 2018-05-22 PROCEDURE — 83880 ASSAY OF NATRIURETIC PEPTIDE: CPT

## 2018-05-22 PROCEDURE — 85379 FIBRIN DEGRADATION QUANT: CPT

## 2018-05-25 ENCOUNTER — CARE COORDINATION (OUTPATIENT)
Dept: CARE COORDINATION | Age: 83
End: 2018-05-25

## 2018-05-25 ENCOUNTER — HOSPITAL ENCOUNTER (OUTPATIENT)
Dept: CT IMAGING | Age: 83
Discharge: HOME | End: 2018-05-27
Payer: MEDICARE

## 2018-05-25 DIAGNOSIS — R06.02 SOB (SHORTNESS OF BREATH): ICD-10-CM

## 2018-05-25 DIAGNOSIS — R79.89 ELEVATED D-DIMER: ICD-10-CM

## 2018-05-25 LAB
BUN BLDV-MCNC: 16 MG/DL (ref 8–23)
CREAT SERPL-MCNC: 0.66 MG/DL (ref 0.5–0.9)
GFR AFRICAN AMERICAN: >60 ML/MIN
GFR NON-AFRICAN AMERICAN: >60 ML/MIN
GFR SERPL CREATININE-BSD FRML MDRD: NORMAL ML/MIN/{1.73_M2}
GFR SERPL CREATININE-BSD FRML MDRD: NORMAL ML/MIN/{1.73_M2}

## 2018-05-25 PROCEDURE — 6360000004 HC RX CONTRAST MEDICATION: Performed by: FAMILY MEDICINE

## 2018-05-25 PROCEDURE — 2580000003 HC RX 258: Performed by: FAMILY MEDICINE

## 2018-05-25 PROCEDURE — 71260 CT THORAX DX C+: CPT

## 2018-05-25 PROCEDURE — 82565 ASSAY OF CREATININE: CPT

## 2018-05-25 PROCEDURE — 84520 ASSAY OF UREA NITROGEN: CPT

## 2018-05-25 PROCEDURE — 36415 COLL VENOUS BLD VENIPUNCTURE: CPT

## 2018-05-25 RX ORDER — SODIUM CHLORIDE 0.9 % (FLUSH) 0.9 %
10 SYRINGE (ML) INJECTION PRN
Status: DISCONTINUED | OUTPATIENT
Start: 2018-05-25 | End: 2018-05-28 | Stop reason: HOSPADM

## 2018-05-25 RX ORDER — 0.9 % SODIUM CHLORIDE 0.9 %
80 INTRAVENOUS SOLUTION INTRAVENOUS ONCE
Status: COMPLETED | OUTPATIENT
Start: 2018-05-25 | End: 2018-05-25

## 2018-05-25 RX ADMIN — SODIUM CHLORIDE 80 ML: 9 INJECTION, SOLUTION INTRAVENOUS at 14:13

## 2018-05-25 RX ADMIN — Medication 10 ML: at 14:14

## 2018-05-25 RX ADMIN — IOPAMIDOL 75 ML: 755 INJECTION, SOLUTION INTRAVENOUS at 14:14

## 2018-05-30 ENCOUNTER — APPOINTMENT (OUTPATIENT)
Dept: GENERAL RADIOLOGY | Age: 83
End: 2018-05-30
Payer: MEDICARE

## 2018-05-30 ENCOUNTER — HOSPITAL ENCOUNTER (EMERGENCY)
Age: 83
Discharge: HOME | End: 2018-05-30
Attending: EMERGENCY MEDICINE
Payer: MEDICARE

## 2018-05-30 VITALS
HEIGHT: 60 IN | RESPIRATION RATE: 16 BRPM | OXYGEN SATURATION: 97 % | DIASTOLIC BLOOD PRESSURE: 52 MMHG | HEART RATE: 80 BPM | WEIGHT: 145 LBS | SYSTOLIC BLOOD PRESSURE: 109 MMHG | TEMPERATURE: 97.9 F | BODY MASS INDEX: 28.47 KG/M2

## 2018-05-30 DIAGNOSIS — E86.0 DEHYDRATION: Primary | ICD-10-CM

## 2018-05-30 DIAGNOSIS — N30.00 ACUTE CYSTITIS WITHOUT HEMATURIA: ICD-10-CM

## 2018-05-30 LAB
-: ABNORMAL
ABSOLUTE EOS #: 0.3 K/UL (ref 0–0.4)
ABSOLUTE IMMATURE GRANULOCYTE: ABNORMAL K/UL (ref 0–0.3)
ABSOLUTE LYMPH #: 1.3 K/UL (ref 1–4.8)
ABSOLUTE MONO #: 0.6 K/UL (ref 0.1–1.3)
ALBUMIN SERPL-MCNC: 3.5 G/DL (ref 3.5–5.2)
ALBUMIN/GLOBULIN RATIO: ABNORMAL (ref 1–2.5)
ALP BLD-CCNC: 51 U/L (ref 35–104)
ALT SERPL-CCNC: 12 U/L (ref 5–33)
AMORPHOUS: ABNORMAL
ANION GAP SERPL CALCULATED.3IONS-SCNC: 14 MMOL/L (ref 9–17)
AST SERPL-CCNC: 15 U/L
BACTERIA: ABNORMAL
BASOPHILS # BLD: 1 % (ref 0–2)
BASOPHILS ABSOLUTE: 0.1 K/UL (ref 0–0.2)
BILIRUB SERPL-MCNC: <0.15 MG/DL (ref 0.3–1.2)
BILIRUBIN DIRECT: <0.08 MG/DL
BILIRUBIN URINE: NEGATIVE
BILIRUBIN, INDIRECT: ABNORMAL MG/DL (ref 0–1)
BUN BLDV-MCNC: 21 MG/DL (ref 8–23)
BUN/CREAT BLD: ABNORMAL (ref 9–20)
CALCIUM SERPL-MCNC: 9 MG/DL (ref 8.6–10.4)
CASTS UA: ABNORMAL /LPF
CHLORIDE BLD-SCNC: 105 MMOL/L (ref 98–107)
CO2: 23 MMOL/L (ref 20–31)
COLOR: YELLOW
COMMENT UA: ABNORMAL
CREAT SERPL-MCNC: 0.94 MG/DL (ref 0.5–0.9)
CRYSTALS, UA: ABNORMAL /HPF
DIFFERENTIAL TYPE: ABNORMAL
EOSINOPHILS RELATIVE PERCENT: 4 % (ref 0–4)
EPITHELIAL CELLS UA: ABNORMAL /HPF
GFR AFRICAN AMERICAN: >60 ML/MIN
GFR NON-AFRICAN AMERICAN: 56 ML/MIN
GFR SERPL CREATININE-BSD FRML MDRD: ABNORMAL ML/MIN/{1.73_M2}
GFR SERPL CREATININE-BSD FRML MDRD: ABNORMAL ML/MIN/{1.73_M2}
GLOBULIN: ABNORMAL G/DL (ref 1.5–3.8)
GLUCOSE BLD-MCNC: 97 MG/DL (ref 70–99)
GLUCOSE URINE: NEGATIVE
HCT VFR BLD CALC: 30.8 % (ref 36–46)
HEMOGLOBIN: 10.3 G/DL (ref 12–16)
IMMATURE GRANULOCYTES: ABNORMAL %
KETONES, URINE: NEGATIVE
LEUKOCYTE ESTERASE, URINE: ABNORMAL
LIPASE: 39 U/L (ref 13–60)
LYMPHOCYTES # BLD: 14 % (ref 24–44)
MAGNESIUM: 2.3 MG/DL (ref 1.6–2.6)
MCH RBC QN AUTO: 29.8 PG (ref 26–34)
MCHC RBC AUTO-ENTMCNC: 33.3 G/DL (ref 31–37)
MCV RBC AUTO: 89.7 FL (ref 80–100)
MONOCYTES # BLD: 7 % (ref 1–7)
MUCUS: ABNORMAL
NITRITE, URINE: POSITIVE
NRBC AUTOMATED: ABNORMAL PER 100 WBC
OTHER OBSERVATIONS UA: ABNORMAL
PDW BLD-RTO: 16.9 % (ref 11.5–14.9)
PH UA: 6.5 (ref 5–8)
PHOSPHORUS: 3.9 MG/DL (ref 2.6–4.5)
PLATELET # BLD: 370 K/UL (ref 150–450)
PLATELET ESTIMATE: ABNORMAL
PMV BLD AUTO: 7.6 FL (ref 6–12)
POTASSIUM SERPL-SCNC: 4.1 MMOL/L (ref 3.7–5.3)
PROTEIN UA: NEGATIVE
RBC # BLD: 3.44 M/UL (ref 4–5.2)
RBC # BLD: ABNORMAL 10*6/UL
RBC UA: ABNORMAL /HPF
RENAL EPITHELIAL, UA: ABNORMAL /HPF
SEG NEUTROPHILS: 74 % (ref 36–66)
SEGMENTED NEUTROPHILS ABSOLUTE COUNT: 6.5 K/UL (ref 1.3–9.1)
SODIUM BLD-SCNC: 142 MMOL/L (ref 135–144)
SPECIFIC GRAVITY UA: 1.01 (ref 1–1.03)
TOTAL PROTEIN: 6.9 G/DL (ref 6.4–8.3)
TRICHOMONAS: ABNORMAL
TURBIDITY: CLEAR
URINE HGB: NEGATIVE
UROBILINOGEN, URINE: NORMAL
WBC # BLD: 8.8 K/UL (ref 3.5–11)
WBC # BLD: ABNORMAL 10*3/UL
WBC UA: ABNORMAL /HPF
YEAST: ABNORMAL

## 2018-05-30 PROCEDURE — 71046 X-RAY EXAM CHEST 2 VIEWS: CPT

## 2018-05-30 PROCEDURE — 96361 HYDRATE IV INFUSION ADD-ON: CPT

## 2018-05-30 PROCEDURE — 80048 BASIC METABOLIC PNL TOTAL CA: CPT

## 2018-05-30 PROCEDURE — 99283 EMERGENCY DEPT VISIT LOW MDM: CPT

## 2018-05-30 PROCEDURE — 80076 HEPATIC FUNCTION PANEL: CPT

## 2018-05-30 PROCEDURE — 2580000003 HC RX 258: Performed by: EMERGENCY MEDICINE

## 2018-05-30 PROCEDURE — 96360 HYDRATION IV INFUSION INIT: CPT

## 2018-05-30 PROCEDURE — 84100 ASSAY OF PHOSPHORUS: CPT

## 2018-05-30 PROCEDURE — 36415 COLL VENOUS BLD VENIPUNCTURE: CPT

## 2018-05-30 PROCEDURE — 87088 URINE BACTERIA CULTURE: CPT

## 2018-05-30 PROCEDURE — 83690 ASSAY OF LIPASE: CPT

## 2018-05-30 PROCEDURE — 87086 URINE CULTURE/COLONY COUNT: CPT

## 2018-05-30 PROCEDURE — 83735 ASSAY OF MAGNESIUM: CPT

## 2018-05-30 PROCEDURE — 81001 URINALYSIS AUTO W/SCOPE: CPT

## 2018-05-30 PROCEDURE — 85025 COMPLETE CBC W/AUTO DIFF WBC: CPT

## 2018-05-30 PROCEDURE — 87186 SC STD MICRODIL/AGAR DIL: CPT

## 2018-05-30 RX ORDER — 0.9 % SODIUM CHLORIDE 0.9 %
500 INTRAVENOUS SOLUTION INTRAVENOUS ONCE
Status: COMPLETED | OUTPATIENT
Start: 2018-05-30 | End: 2018-05-30

## 2018-05-30 RX ORDER — 0.9 % SODIUM CHLORIDE 0.9 %
1000 INTRAVENOUS SOLUTION INTRAVENOUS ONCE
Status: COMPLETED | OUTPATIENT
Start: 2018-05-30 | End: 2018-05-30

## 2018-05-30 RX ORDER — SODIUM CHLORIDE 9 MG/ML
INJECTION, SOLUTION INTRAVENOUS CONTINUOUS
Status: DISCONTINUED | OUTPATIENT
Start: 2018-05-30 | End: 2018-05-30 | Stop reason: HOSPADM

## 2018-05-30 RX ORDER — CEPHALEXIN 500 MG/1
500 CAPSULE ORAL 3 TIMES DAILY
Qty: 21 CAPSULE | Refills: 0 | Status: SHIPPED | OUTPATIENT
Start: 2018-05-30 | End: 2018-06-11 | Stop reason: ALTCHOICE

## 2018-05-30 RX ADMIN — SODIUM CHLORIDE: 9 INJECTION, SOLUTION INTRAVENOUS at 16:41

## 2018-05-30 RX ADMIN — SODIUM CHLORIDE 500 ML: 9 INJECTION, SOLUTION INTRAVENOUS at 15:48

## 2018-05-30 RX ADMIN — SODIUM CHLORIDE 1000 ML: 9 INJECTION, SOLUTION INTRAVENOUS at 16:48

## 2018-05-30 ASSESSMENT — ENCOUNTER SYMPTOMS
SHORTNESS OF BREATH: 0
ABDOMINAL PAIN: 0
RHINORRHEA: 0

## 2018-05-31 ENCOUNTER — CARE COORDINATION (OUTPATIENT)
Dept: CARE COORDINATION | Age: 83
End: 2018-05-31

## 2018-06-01 LAB
CULTURE: ABNORMAL
CULTURE: ABNORMAL
Lab: ABNORMAL
ORGANISM: ABNORMAL
SPECIMEN DESCRIPTION: ABNORMAL
SPECIMEN DESCRIPTION: ABNORMAL
STATUS: ABNORMAL

## 2018-06-11 ENCOUNTER — CARE COORDINATION (OUTPATIENT)
Dept: CARE COORDINATION | Age: 83
End: 2018-06-11

## 2018-06-14 ENCOUNTER — APPOINTMENT (OUTPATIENT)
Dept: CT IMAGING | Age: 83
End: 2018-06-14
Payer: MEDICARE

## 2018-06-14 ENCOUNTER — HOSPITAL ENCOUNTER (EMERGENCY)
Age: 83
Discharge: HOME | End: 2018-06-14
Attending: EMERGENCY MEDICINE
Payer: MEDICARE

## 2018-06-14 VITALS
TEMPERATURE: 97.4 F | DIASTOLIC BLOOD PRESSURE: 61 MMHG | WEIGHT: 145 LBS | HEART RATE: 78 BPM | BODY MASS INDEX: 28.47 KG/M2 | HEIGHT: 60 IN | SYSTOLIC BLOOD PRESSURE: 182 MMHG | RESPIRATION RATE: 16 BRPM | OXYGEN SATURATION: 95 %

## 2018-06-14 DIAGNOSIS — R51.9 NONINTRACTABLE HEADACHE, UNSPECIFIED CHRONICITY PATTERN, UNSPECIFIED HEADACHE TYPE: Primary | ICD-10-CM

## 2018-06-14 DIAGNOSIS — M62.838 TRAPEZIUS MUSCLE SPASM: ICD-10-CM

## 2018-06-14 PROCEDURE — 72125 CT NECK SPINE W/O DYE: CPT

## 2018-06-14 PROCEDURE — 70450 CT HEAD/BRAIN W/O DYE: CPT

## 2018-06-14 PROCEDURE — 6370000000 HC RX 637 (ALT 250 FOR IP): Performed by: EMERGENCY MEDICINE

## 2018-06-14 PROCEDURE — 99284 EMERGENCY DEPT VISIT MOD MDM: CPT

## 2018-06-14 RX ORDER — CYCLOBENZAPRINE HCL 10 MG
10 TABLET ORAL ONCE
Status: COMPLETED | OUTPATIENT
Start: 2018-06-14 | End: 2018-06-14

## 2018-06-14 RX ORDER — ACETAMINOPHEN AND CODEINE PHOSPHATE 300; 30 MG/1; MG/1
1 TABLET ORAL ONCE
Status: DISCONTINUED | OUTPATIENT
Start: 2018-06-14 | End: 2018-06-14

## 2018-06-14 RX ORDER — IBUPROFEN 600 MG/1
600 TABLET ORAL EVERY 6 HOURS PRN
Qty: 40 TABLET | Refills: 0 | Status: SHIPPED | OUTPATIENT
Start: 2018-06-14 | End: 2018-06-20 | Stop reason: ALTCHOICE

## 2018-06-14 RX ORDER — IBUPROFEN 600 MG/1
600 TABLET ORAL ONCE
Status: COMPLETED | OUTPATIENT
Start: 2018-06-14 | End: 2018-06-14

## 2018-06-14 RX ORDER — CYCLOBENZAPRINE HCL 5 MG
5 TABLET ORAL 3 TIMES DAILY PRN
Qty: 15 TABLET | Refills: 0 | Status: SHIPPED | OUTPATIENT
Start: 2018-06-14 | End: 2018-06-24

## 2018-06-14 RX ORDER — ACETAMINOPHEN AND CODEINE PHOSPHATE 300; 30 MG/1; MG/1
1 TABLET ORAL ONCE
Status: COMPLETED | OUTPATIENT
Start: 2018-06-14 | End: 2018-06-14

## 2018-06-14 RX ADMIN — IBUPROFEN 600 MG: 600 TABLET ORAL at 09:28

## 2018-06-14 RX ADMIN — CYCLOBENZAPRINE HYDROCHLORIDE 10 MG: 10 TABLET, FILM COATED ORAL at 09:28

## 2018-06-14 RX ADMIN — ACETAMINOPHEN AND CODEINE PHOSPHATE 1 TABLET: 300; 30 TABLET ORAL at 09:28

## 2018-06-14 ASSESSMENT — PAIN SCALES - GENERAL
PAINLEVEL_OUTOF10: 10
PAINLEVEL_OUTOF10: 10

## 2018-06-14 ASSESSMENT — PAIN DESCRIPTION - DESCRIPTORS: DESCRIPTORS: ACHING

## 2018-06-14 ASSESSMENT — PAIN DESCRIPTION - PAIN TYPE: TYPE: ACUTE PAIN

## 2018-06-14 ASSESSMENT — PAIN DESCRIPTION - LOCATION: LOCATION: HEAD

## 2018-06-15 ENCOUNTER — HOSPITAL ENCOUNTER (EMERGENCY)
Age: 83
Discharge: HOME | End: 2018-06-15
Attending: EMERGENCY MEDICINE
Payer: MEDICARE

## 2018-06-15 ENCOUNTER — APPOINTMENT (OUTPATIENT)
Dept: CT IMAGING | Age: 83
End: 2018-06-15
Payer: MEDICARE

## 2018-06-15 VITALS
DIASTOLIC BLOOD PRESSURE: 49 MMHG | RESPIRATION RATE: 16 BRPM | BODY MASS INDEX: 28.47 KG/M2 | SYSTOLIC BLOOD PRESSURE: 153 MMHG | TEMPERATURE: 98 F | OXYGEN SATURATION: 95 % | HEART RATE: 74 BPM | HEIGHT: 60 IN | WEIGHT: 145 LBS

## 2018-06-15 DIAGNOSIS — M31.6 TEMPORAL ARTERITIS (HCC): ICD-10-CM

## 2018-06-15 DIAGNOSIS — G44.1 OTHER VASCULAR HEADACHE: Primary | ICD-10-CM

## 2018-06-15 LAB
ABSOLUTE EOS #: 0.2 K/UL (ref 0–0.4)
ABSOLUTE IMMATURE GRANULOCYTE: ABNORMAL K/UL (ref 0–0.3)
ABSOLUTE LYMPH #: 1.1 K/UL (ref 1–4.8)
ABSOLUTE MONO #: 0.6 K/UL (ref 0.1–1.3)
ANION GAP SERPL CALCULATED.3IONS-SCNC: 13 MMOL/L (ref 9–17)
BASOPHILS # BLD: 1 % (ref 0–2)
BASOPHILS ABSOLUTE: 0.1 K/UL (ref 0–0.2)
BUN BLDV-MCNC: 15 MG/DL (ref 8–23)
BUN/CREAT BLD: NORMAL (ref 9–20)
CALCIUM SERPL-MCNC: 8.9 MG/DL (ref 8.6–10.4)
CHLORIDE BLD-SCNC: 102 MMOL/L (ref 98–107)
CO2: 25 MMOL/L (ref 20–31)
CREAT SERPL-MCNC: 0.66 MG/DL (ref 0.5–0.9)
DIFFERENTIAL TYPE: ABNORMAL
EOSINOPHILS RELATIVE PERCENT: 3 % (ref 0–4)
GFR AFRICAN AMERICAN: >60 ML/MIN
GFR NON-AFRICAN AMERICAN: >60 ML/MIN
GFR SERPL CREATININE-BSD FRML MDRD: NORMAL ML/MIN/{1.73_M2}
GFR SERPL CREATININE-BSD FRML MDRD: NORMAL ML/MIN/{1.73_M2}
GLUCOSE BLD-MCNC: 94 MG/DL (ref 70–99)
HCT VFR BLD CALC: 32 % (ref 36–46)
HEMOGLOBIN: 10.6 G/DL (ref 12–16)
IMMATURE GRANULOCYTES: ABNORMAL %
LYMPHOCYTES # BLD: 14 % (ref 24–44)
MCH RBC QN AUTO: 30.2 PG (ref 26–34)
MCHC RBC AUTO-ENTMCNC: 33.2 G/DL (ref 31–37)
MCV RBC AUTO: 90.8 FL (ref 80–100)
MONOCYTES # BLD: 7 % (ref 1–7)
NRBC AUTOMATED: ABNORMAL PER 100 WBC
PDW BLD-RTO: 16.6 % (ref 11.5–14.9)
PLATELET # BLD: 293 K/UL (ref 150–450)
PLATELET ESTIMATE: ABNORMAL
PMV BLD AUTO: 7.7 FL (ref 6–12)
POTASSIUM SERPL-SCNC: 4.1 MMOL/L (ref 3.7–5.3)
RBC # BLD: 3.52 M/UL (ref 4–5.2)
RBC # BLD: ABNORMAL 10*6/UL
SEDIMENTATION RATE, ERYTHROCYTE: 57 MM (ref 0–20)
SEG NEUTROPHILS: 75 % (ref 36–66)
SEGMENTED NEUTROPHILS ABSOLUTE COUNT: 6.1 K/UL (ref 1.3–9.1)
SODIUM BLD-SCNC: 140 MMOL/L (ref 135–144)
WBC # BLD: 8.1 K/UL (ref 3.5–11)
WBC # BLD: ABNORMAL 10*3/UL

## 2018-06-15 PROCEDURE — 70496 CT ANGIOGRAPHY HEAD: CPT

## 2018-06-15 PROCEDURE — 36415 COLL VENOUS BLD VENIPUNCTURE: CPT

## 2018-06-15 PROCEDURE — 70498 CT ANGIOGRAPHY NECK: CPT

## 2018-06-15 PROCEDURE — 99284 EMERGENCY DEPT VISIT MOD MDM: CPT

## 2018-06-15 PROCEDURE — 80048 BASIC METABOLIC PNL TOTAL CA: CPT

## 2018-06-15 PROCEDURE — 85651 RBC SED RATE NONAUTOMATED: CPT

## 2018-06-15 PROCEDURE — 85025 COMPLETE CBC W/AUTO DIFF WBC: CPT

## 2018-06-15 PROCEDURE — 2580000003 HC RX 258: Performed by: EMERGENCY MEDICINE

## 2018-06-15 PROCEDURE — 96375 TX/PRO/DX INJ NEW DRUG ADDON: CPT

## 2018-06-15 PROCEDURE — 96374 THER/PROPH/DIAG INJ IV PUSH: CPT

## 2018-06-15 PROCEDURE — 6360000004 HC RX CONTRAST MEDICATION: Performed by: EMERGENCY MEDICINE

## 2018-06-15 PROCEDURE — 6360000002 HC RX W HCPCS: Performed by: EMERGENCY MEDICINE

## 2018-06-15 RX ORDER — METHYLPREDNISOLONE SODIUM SUCCINATE 125 MG/2ML
125 INJECTION, POWDER, LYOPHILIZED, FOR SOLUTION INTRAMUSCULAR; INTRAVENOUS ONCE
Status: COMPLETED | OUTPATIENT
Start: 2018-06-15 | End: 2018-06-15

## 2018-06-15 RX ORDER — ACETAMINOPHEN AND CODEINE PHOSPHATE 300; 30 MG/1; MG/1
1 TABLET ORAL EVERY 6 HOURS PRN
Qty: 10 TABLET | Refills: 0 | Status: SHIPPED | OUTPATIENT
Start: 2018-06-15 | End: 2018-06-18

## 2018-06-15 RX ORDER — 0.9 % SODIUM CHLORIDE 0.9 %
80 INTRAVENOUS SOLUTION INTRAVENOUS ONCE
Status: COMPLETED | OUTPATIENT
Start: 2018-06-15 | End: 2018-06-15

## 2018-06-15 RX ORDER — DIPHENHYDRAMINE HYDROCHLORIDE 50 MG/ML
12.5 INJECTION INTRAMUSCULAR; INTRAVENOUS ONCE
Status: COMPLETED | OUTPATIENT
Start: 2018-06-15 | End: 2018-06-15

## 2018-06-15 RX ORDER — 0.9 % SODIUM CHLORIDE 0.9 %
500 INTRAVENOUS SOLUTION INTRAVENOUS ONCE
Status: COMPLETED | OUTPATIENT
Start: 2018-06-15 | End: 2018-06-15

## 2018-06-15 RX ORDER — PREDNISONE 10 MG/1
50 TABLET ORAL DAILY
Qty: 35 TABLET | Refills: 0 | Status: SHIPPED | OUTPATIENT
Start: 2018-06-15 | End: 2018-06-22

## 2018-06-15 RX ORDER — SODIUM CHLORIDE 0.9 % (FLUSH) 0.9 %
10 SYRINGE (ML) INJECTION PRN
Status: DISCONTINUED | OUTPATIENT
Start: 2018-06-15 | End: 2018-06-16 | Stop reason: HOSPADM

## 2018-06-15 RX ADMIN — METHYLPREDNISOLONE SODIUM SUCCINATE 125 MG: 125 INJECTION, POWDER, FOR SOLUTION INTRAMUSCULAR; INTRAVENOUS at 13:38

## 2018-06-15 RX ADMIN — Medication 10 ML: at 13:20

## 2018-06-15 RX ADMIN — PROCHLORPERAZINE EDISYLATE 5 MG: 5 INJECTION INTRAMUSCULAR; INTRAVENOUS at 12:19

## 2018-06-15 RX ADMIN — DIPHENHYDRAMINE HYDROCHLORIDE 12.5 MG: 50 INJECTION, SOLUTION INTRAMUSCULAR; INTRAVENOUS at 12:20

## 2018-06-15 RX ADMIN — SODIUM CHLORIDE 80 ML: 9 INJECTION, SOLUTION INTRAVENOUS at 13:20

## 2018-06-15 RX ADMIN — IOPAMIDOL 75 ML: 755 INJECTION, SOLUTION INTRAVENOUS at 13:19

## 2018-06-15 RX ADMIN — SODIUM CHLORIDE 500 ML: 9 INJECTION, SOLUTION INTRAVENOUS at 12:17

## 2018-06-18 ENCOUNTER — CARE COORDINATION (OUTPATIENT)
Dept: CARE COORDINATION | Age: 83
End: 2018-06-18

## 2018-07-03 ENCOUNTER — CARE COORDINATION (OUTPATIENT)
Dept: CARE COORDINATION | Age: 83
End: 2018-07-03

## 2018-07-11 ENCOUNTER — CARE COORDINATION (OUTPATIENT)
Dept: CARE COORDINATION | Age: 83
End: 2018-07-11

## 2018-07-11 NOTE — CARE COORDINATION
Called and left a message instructing her to stop taking the iron supplement and we need her to give a stool specimen so it could be checked for residual clostridium, she can come to office to get a specimen cup. Asked her to call me back if she had any questions.

## 2018-07-12 ENCOUNTER — HOSPITAL ENCOUNTER (OUTPATIENT)
Age: 83
Setting detail: SPECIMEN
Discharge: HOME | End: 2018-07-12
Payer: MEDICARE

## 2018-07-12 DIAGNOSIS — R19.7 DIARRHEA, UNSPECIFIED TYPE: ICD-10-CM

## 2018-07-13 LAB
C DIFFICILE TOXINS, PCR: ABNORMAL
CAMPYLOBACTER PCR: NORMAL
SALMONELLA PCR: NORMAL
SHIGATOXIN GENE PCR: NORMAL
SHIGELLA SP PCR: NORMAL
SPECIMEN DESCRIPTION: ABNORMAL
SPECIMEN: NORMAL

## 2018-07-16 ENCOUNTER — CARE COORDINATION (OUTPATIENT)
Dept: CARE COORDINATION | Age: 83
End: 2018-07-16

## 2018-07-16 NOTE — CARE COORDINATION
I called Evelin back, informed her to make an appt to be seen in 2 weeks, should be about 1 week after antibiotic is done. She will call back to make appt.     Future Appointments  Date Time Provider Jame Delgado   8/23/2018 3:10 PM Ahsan Rivera MD [facility] [address] [place] [address]

## 2018-07-26 ENCOUNTER — HOSPITAL ENCOUNTER (OUTPATIENT)
Age: 83
Setting detail: SPECIMEN
Discharge: HOME | End: 2018-07-26
Payer: MEDICARE

## 2018-07-26 DIAGNOSIS — A04.72 C. DIFFICILE DIARRHEA: ICD-10-CM

## 2018-07-26 LAB
CAMPYLOBACTER PCR: NORMAL
CULTURE: NORMAL
Lab: NORMAL
SALMONELLA PCR: NORMAL
SHIGATOXIN GENE PCR: NORMAL
SHIGELLA SP PCR: NORMAL
SPECIMEN DESCRIPTION: NORMAL
SPECIMEN: NORMAL
STATUS: NORMAL

## 2018-08-01 ENCOUNTER — CARE COORDINATION (OUTPATIENT)
Dept: CARE COORDINATION | Age: 83
End: 2018-08-01

## 2018-08-01 NOTE — CARE COORDINATION
She had an office visit and repeat stool culture after she finished her antibiotics. Culture negative for any growth. She has occasional bout of loose stool but mostly formed, normal. Vaginal itching also is improved. No issues or concerns, will follow in a month.     Future Appointments  Date Time Provider Jame Delgado   8/23/2018 3:10 PM Eze Plasencia MD [facility] [address] [place] [address]

## 2018-08-23 ENCOUNTER — CARE COORDINATION (OUTPATIENT)
Dept: CARE COORDINATION | Age: 83
End: 2018-08-23

## 2018-08-23 NOTE — CARE COORDINATION
losartan (COZAAR) 100 MG tablet Take 1 tablet by mouth daily 10/25/17   Valerie Stanley MD   acetaminophen 650 MG TABS Take 650 mg by mouth every 4 hours as needed. 7/9/14   Valerie Stanley MD   melatonin 3 MG TABS tablet Take 3 mg by mouth nightly. Historical Provider, MD   multivitamin (OCUVITE) TABS per tablet Take 1 tablet by mouth daily.     Historical Provider, MD       Future Appointments  Date Time Provider Jame Delgado   12/3/2018 1:30 PM Valerie Stanley MD [address]

## 2018-09-06 ENCOUNTER — HOSPITAL ENCOUNTER (INPATIENT)
Age: 83
LOS: 6 days | Discharge: NURSING, REHAB OR LONG-TERM CARE | DRG: 415 | End: 2018-09-12
Attending: EMERGENCY MEDICINE | Admitting: FAMILY MEDICINE
Payer: MEDICARE

## 2018-09-06 ENCOUNTER — APPOINTMENT (OUTPATIENT)
Dept: CT IMAGING | Age: 83
DRG: 415 | End: 2018-09-06
Payer: MEDICARE

## 2018-09-06 DIAGNOSIS — K56.609 INTESTINAL OBSTRUCTION, UNSPECIFIED CAUSE, UNSPECIFIED WHETHER PARTIAL OR COMPLETE (HCC): ICD-10-CM

## 2018-09-06 DIAGNOSIS — Z90.49 S/P CHOLECYSTECTOMY: Chronic | ICD-10-CM

## 2018-09-06 DIAGNOSIS — K85.80 OTHER ACUTE PANCREATITIS, UNSPECIFIED COMPLICATION STATUS: Primary | ICD-10-CM

## 2018-09-06 LAB
ABSOLUTE EOS #: 0 K/UL (ref 0–0.4)
ABSOLUTE IMMATURE GRANULOCYTE: ABNORMAL K/UL (ref 0–0.3)
ABSOLUTE LYMPH #: 0.62 K/UL (ref 1–4.8)
ABSOLUTE MONO #: 0.62 K/UL (ref 0.1–1.3)
ALBUMIN SERPL-MCNC: 4.1 G/DL (ref 3.5–5.2)
ALBUMIN/GLOBULIN RATIO: ABNORMAL (ref 1–2.5)
ALP BLD-CCNC: 76 U/L (ref 35–104)
ALT SERPL-CCNC: 31 U/L (ref 5–33)
ANION GAP SERPL CALCULATED.3IONS-SCNC: 14 MMOL/L (ref 9–17)
AST SERPL-CCNC: 66 U/L
BASOPHILS # BLD: 0 % (ref 0–2)
BASOPHILS ABSOLUTE: 0 K/UL (ref 0–0.2)
BILIRUB SERPL-MCNC: 0.45 MG/DL (ref 0.3–1.2)
BUN BLDV-MCNC: 20 MG/DL (ref 8–23)
BUN/CREAT BLD: ABNORMAL (ref 9–20)
CALCIUM SERPL-MCNC: 10.1 MG/DL (ref 8.6–10.4)
CHLORIDE BLD-SCNC: 102 MMOL/L (ref 98–107)
CO2: 26 MMOL/L (ref 20–31)
CREAT SERPL-MCNC: 0.71 MG/DL (ref 0.5–0.9)
DIFFERENTIAL TYPE: ABNORMAL
EOSINOPHILS RELATIVE PERCENT: 0 % (ref 0–4)
GFR AFRICAN AMERICAN: >60 ML/MIN
GFR NON-AFRICAN AMERICAN: >60 ML/MIN
GFR SERPL CREATININE-BSD FRML MDRD: ABNORMAL ML/MIN/{1.73_M2}
GFR SERPL CREATININE-BSD FRML MDRD: ABNORMAL ML/MIN/{1.73_M2}
GLUCOSE BLD-MCNC: 184 MG/DL (ref 70–99)
HCT VFR BLD CALC: 36.1 % (ref 36–46)
HEMOGLOBIN: 11.6 G/DL (ref 12–16)
IMMATURE GRANULOCYTES: ABNORMAL %
LACTIC ACID, WHOLE BLOOD: NORMAL MMOL/L (ref 0.7–2.1)
LACTIC ACID: 1.6 MMOL/L (ref 0.5–2.2)
LIPASE: 1290 U/L (ref 13–60)
LYMPHOCYTES # BLD: 3 % (ref 24–44)
MCH RBC QN AUTO: 28 PG (ref 26–34)
MCHC RBC AUTO-ENTMCNC: 32.2 G/DL (ref 31–37)
MCV RBC AUTO: 86.9 FL (ref 80–100)
MONOCYTES # BLD: 3 % (ref 1–7)
MORPHOLOGY: ABNORMAL
NRBC AUTOMATED: ABNORMAL PER 100 WBC
PDW BLD-RTO: 16.1 % (ref 11.5–14.9)
PLATELET # BLD: 325 K/UL (ref 150–450)
PLATELET ESTIMATE: ABNORMAL
PMV BLD AUTO: 7.7 FL (ref 6–12)
POTASSIUM SERPL-SCNC: 3.9 MMOL/L (ref 3.7–5.3)
RBC # BLD: 4.15 M/UL (ref 4–5.2)
RBC # BLD: ABNORMAL 10*6/UL
SEG NEUTROPHILS: 94 % (ref 36–66)
SEGMENTED NEUTROPHILS ABSOLUTE COUNT: 19.26 K/UL (ref 1.3–9.1)
SODIUM BLD-SCNC: 142 MMOL/L (ref 135–144)
TOTAL PROTEIN: 8.3 G/DL (ref 6.4–8.3)
WBC # BLD: 20.5 K/UL (ref 3.5–11)
WBC # BLD: ABNORMAL 10*3/UL

## 2018-09-06 PROCEDURE — 96374 THER/PROPH/DIAG INJ IV PUSH: CPT

## 2018-09-06 PROCEDURE — 99285 EMERGENCY DEPT VISIT HI MDM: CPT

## 2018-09-06 PROCEDURE — 80053 COMPREHEN METABOLIC PANEL: CPT

## 2018-09-06 PROCEDURE — 85025 COMPLETE CBC W/AUTO DIFF WBC: CPT

## 2018-09-06 PROCEDURE — 36415 COLL VENOUS BLD VENIPUNCTURE: CPT

## 2018-09-06 PROCEDURE — 96375 TX/PRO/DX INJ NEW DRUG ADDON: CPT

## 2018-09-06 PROCEDURE — 83605 ASSAY OF LACTIC ACID: CPT

## 2018-09-06 PROCEDURE — 2060000000 HC ICU INTERMEDIATE R&B

## 2018-09-06 PROCEDURE — 6360000002 HC RX W HCPCS: Performed by: STUDENT IN AN ORGANIZED HEALTH CARE EDUCATION/TRAINING PROGRAM

## 2018-09-06 PROCEDURE — 6360000004 HC RX CONTRAST MEDICATION: Performed by: EMERGENCY MEDICINE

## 2018-09-06 PROCEDURE — 2580000003 HC RX 258: Performed by: EMERGENCY MEDICINE

## 2018-09-06 PROCEDURE — 74177 CT ABD & PELVIS W/CONTRAST: CPT

## 2018-09-06 PROCEDURE — 81001 URINALYSIS AUTO W/SCOPE: CPT

## 2018-09-06 PROCEDURE — 83690 ASSAY OF LIPASE: CPT

## 2018-09-06 RX ORDER — SODIUM CHLORIDE 0.9 % (FLUSH) 0.9 %
10 SYRINGE (ML) INJECTION PRN
Status: DISCONTINUED | OUTPATIENT
Start: 2018-09-06 | End: 2018-09-08

## 2018-09-06 RX ORDER — MORPHINE SULFATE 2 MG/ML
2 INJECTION, SOLUTION INTRAMUSCULAR; INTRAVENOUS ONCE
Status: COMPLETED | OUTPATIENT
Start: 2018-09-06 | End: 2018-09-06

## 2018-09-06 RX ORDER — ONDANSETRON 2 MG/ML
4 INJECTION INTRAMUSCULAR; INTRAVENOUS ONCE
Status: COMPLETED | OUTPATIENT
Start: 2018-09-06 | End: 2018-09-06

## 2018-09-06 RX ORDER — CIPROFLOXACIN 2 MG/ML
400 INJECTION, SOLUTION INTRAVENOUS EVERY 12 HOURS
Status: DISCONTINUED | OUTPATIENT
Start: 2018-09-06 | End: 2018-09-12

## 2018-09-06 RX ORDER — 0.9 % SODIUM CHLORIDE 0.9 %
80 INTRAVENOUS SOLUTION INTRAVENOUS ONCE
Status: COMPLETED | OUTPATIENT
Start: 2018-09-06 | End: 2018-09-06

## 2018-09-06 RX ADMIN — SODIUM CHLORIDE 80 ML: 9 INJECTION, SOLUTION INTRAVENOUS at 22:30

## 2018-09-06 RX ADMIN — ONDANSETRON 4 MG: 2 INJECTION INTRAMUSCULAR; INTRAVENOUS at 21:37

## 2018-09-06 RX ADMIN — IOPAMIDOL 75 ML: 755 INJECTION, SOLUTION INTRAVENOUS at 22:30

## 2018-09-06 RX ADMIN — CIPROFLOXACIN 400 MG: 2 INJECTION, SOLUTION INTRAVENOUS at 23:22

## 2018-09-06 RX ADMIN — Medication 10 ML: at 22:30

## 2018-09-06 RX ADMIN — MORPHINE SULFATE 2 MG: 2 INJECTION, SOLUTION INTRAMUSCULAR; INTRAVENOUS at 21:38

## 2018-09-06 ASSESSMENT — PAIN DESCRIPTION - PAIN TYPE: TYPE: ACUTE PAIN

## 2018-09-06 ASSESSMENT — ENCOUNTER SYMPTOMS
NAUSEA: 1
DIARRHEA: 0
VOMITING: 1
ABDOMINAL PAIN: 1
CONSTIPATION: 1
SHORTNESS OF BREATH: 0

## 2018-09-06 ASSESSMENT — PAIN DESCRIPTION - DESCRIPTORS: DESCRIPTORS: CONSTANT

## 2018-09-06 ASSESSMENT — PAIN SCALES - GENERAL
PAINLEVEL_OUTOF10: 8
PAINLEVEL_OUTOF10: 4
PAINLEVEL_OUTOF10: 10

## 2018-09-06 ASSESSMENT — PAIN DESCRIPTION - LOCATION
LOCATION: ABDOMEN
LOCATION: ABDOMEN;BACK

## 2018-09-06 ASSESSMENT — PAIN DESCRIPTION - ORIENTATION: ORIENTATION: LOWER

## 2018-09-06 ASSESSMENT — PAIN DESCRIPTION - FREQUENCY: FREQUENCY: CONTINUOUS

## 2018-09-07 ENCOUNTER — ANESTHESIA EVENT (OUTPATIENT)
Dept: OPERATING ROOM | Age: 83
DRG: 415 | End: 2018-09-07
Payer: MEDICARE

## 2018-09-07 ENCOUNTER — APPOINTMENT (OUTPATIENT)
Dept: ULTRASOUND IMAGING | Age: 83
DRG: 415 | End: 2018-09-07
Payer: MEDICARE

## 2018-09-07 PROBLEM — K85.10 ACUTE GALLSTONE PANCREATITIS: Status: ACTIVE | Noted: 2018-09-07

## 2018-09-07 PROBLEM — K85.90 ACUTE PANCREATITIS WITHOUT INFECTION OR NECROSIS: Status: ACTIVE | Noted: 2018-09-07

## 2018-09-07 LAB
-: ABNORMAL
ABSOLUTE EOS #: 0 K/UL (ref 0–0.4)
ABSOLUTE IMMATURE GRANULOCYTE: ABNORMAL K/UL (ref 0–0.3)
ABSOLUTE LYMPH #: 0.9 K/UL (ref 1–4.8)
ABSOLUTE MONO #: 0.8 K/UL (ref 0.1–1.3)
ALBUMIN SERPL-MCNC: 3.4 G/DL (ref 3.5–5.2)
ALBUMIN/GLOBULIN RATIO: ABNORMAL (ref 1–2.5)
ALP BLD-CCNC: 58 U/L (ref 35–104)
ALT SERPL-CCNC: 24 U/L (ref 5–33)
AMORPHOUS: ABNORMAL
AMYLASE: 263 U/L (ref 28–100)
ANION GAP SERPL CALCULATED.3IONS-SCNC: 9 MMOL/L (ref 9–17)
AST SERPL-CCNC: 29 U/L
BACTERIA: ABNORMAL
BASOPHILS # BLD: 0 % (ref 0–2)
BASOPHILS ABSOLUTE: 0 K/UL (ref 0–0.2)
BILIRUB SERPL-MCNC: 0.23 MG/DL (ref 0.3–1.2)
BILIRUBIN URINE: NEGATIVE
BUN BLDV-MCNC: 19 MG/DL (ref 8–23)
BUN/CREAT BLD: ABNORMAL (ref 9–20)
CALCIUM SERPL-MCNC: 8.6 MG/DL (ref 8.6–10.4)
CASTS UA: ABNORMAL /LPF
CHLORIDE BLD-SCNC: 105 MMOL/L (ref 98–107)
CO2: 26 MMOL/L (ref 20–31)
COLOR: YELLOW
COMMENT UA: ABNORMAL
CREAT SERPL-MCNC: 0.72 MG/DL (ref 0.5–0.9)
CRYSTALS, UA: ABNORMAL /HPF
DIFFERENTIAL TYPE: ABNORMAL
EOSINOPHILS RELATIVE PERCENT: 0 % (ref 0–4)
EPITHELIAL CELLS UA: ABNORMAL /HPF
GFR AFRICAN AMERICAN: >60 ML/MIN
GFR NON-AFRICAN AMERICAN: >60 ML/MIN
GFR SERPL CREATININE-BSD FRML MDRD: ABNORMAL ML/MIN/{1.73_M2}
GFR SERPL CREATININE-BSD FRML MDRD: ABNORMAL ML/MIN/{1.73_M2}
GLUCOSE BLD-MCNC: 105 MG/DL (ref 70–99)
GLUCOSE URINE: NEGATIVE
HCT VFR BLD CALC: 30.3 % (ref 36–46)
HEMOGLOBIN: 10 G/DL (ref 12–16)
IMMATURE GRANULOCYTES: ABNORMAL %
KETONES, URINE: NEGATIVE
LEUKOCYTE ESTERASE, URINE: NEGATIVE
LIPASE: 266 U/L (ref 13–60)
LYMPHOCYTES # BLD: 9 % (ref 24–44)
MCH RBC QN AUTO: 28.6 PG (ref 26–34)
MCHC RBC AUTO-ENTMCNC: 32.9 G/DL (ref 31–37)
MCV RBC AUTO: 87 FL (ref 80–100)
MONOCYTES # BLD: 8 % (ref 1–7)
MUCUS: ABNORMAL
NITRITE, URINE: NEGATIVE
NRBC AUTOMATED: ABNORMAL PER 100 WBC
OTHER OBSERVATIONS UA: ABNORMAL
PDW BLD-RTO: 15.9 % (ref 11.5–14.9)
PH UA: 6.5 (ref 5–8)
PLATELET # BLD: 317 K/UL (ref 150–450)
PLATELET ESTIMATE: ABNORMAL
PMV BLD AUTO: 7.7 FL (ref 6–12)
POTASSIUM SERPL-SCNC: 4.1 MMOL/L (ref 3.7–5.3)
PROTEIN UA: ABNORMAL
RBC # BLD: 3.49 M/UL (ref 4–5.2)
RBC # BLD: ABNORMAL 10*6/UL
RBC UA: ABNORMAL /HPF
RENAL EPITHELIAL, UA: ABNORMAL /HPF
SEG NEUTROPHILS: 83 % (ref 36–66)
SEGMENTED NEUTROPHILS ABSOLUTE COUNT: 8.8 K/UL (ref 1.3–9.1)
SODIUM BLD-SCNC: 140 MMOL/L (ref 135–144)
SPECIFIC GRAVITY UA: 1.05 (ref 1–1.03)
TOTAL PROTEIN: 6.4 G/DL (ref 6.4–8.3)
TRICHOMONAS: ABNORMAL
TURBIDITY: CLEAR
URINE HGB: NEGATIVE
UROBILINOGEN, URINE: NORMAL
WBC # BLD: 10.6 K/UL (ref 3.5–11)
WBC # BLD: ABNORMAL 10*3/UL
WBC UA: ABNORMAL /HPF
YEAST: ABNORMAL

## 2018-09-07 PROCEDURE — 82150 ASSAY OF AMYLASE: CPT

## 2018-09-07 PROCEDURE — 2500000003 HC RX 250 WO HCPCS: Performed by: STUDENT IN AN ORGANIZED HEALTH CARE EDUCATION/TRAINING PROGRAM

## 2018-09-07 PROCEDURE — 85025 COMPLETE CBC W/AUTO DIFF WBC: CPT

## 2018-09-07 PROCEDURE — 83690 ASSAY OF LIPASE: CPT

## 2018-09-07 PROCEDURE — 2060000000 HC ICU INTERMEDIATE R&B

## 2018-09-07 PROCEDURE — 2580000003 HC RX 258: Performed by: SURGERY

## 2018-09-07 PROCEDURE — 80053 COMPREHEN METABOLIC PANEL: CPT

## 2018-09-07 PROCEDURE — 6360000002 HC RX W HCPCS: Performed by: FAMILY MEDICINE

## 2018-09-07 PROCEDURE — 36415 COLL VENOUS BLD VENIPUNCTURE: CPT

## 2018-09-07 PROCEDURE — 76705 ECHO EXAM OF ABDOMEN: CPT

## 2018-09-07 PROCEDURE — 6360000002 HC RX W HCPCS: Performed by: STUDENT IN AN ORGANIZED HEALTH CARE EDUCATION/TRAINING PROGRAM

## 2018-09-07 PROCEDURE — 99222 1ST HOSP IP/OBS MODERATE 55: CPT | Performed by: FAMILY MEDICINE

## 2018-09-07 PROCEDURE — 2580000003 HC RX 258: Performed by: FAMILY MEDICINE

## 2018-09-07 RX ORDER — MORPHINE SULFATE 2 MG/ML
2 INJECTION, SOLUTION INTRAMUSCULAR; INTRAVENOUS
Status: DISCONTINUED | OUTPATIENT
Start: 2018-09-07 | End: 2018-09-08

## 2018-09-07 RX ORDER — SODIUM CHLORIDE 9 MG/ML
INJECTION, SOLUTION INTRAVENOUS CONTINUOUS
Status: DISCONTINUED | OUTPATIENT
Start: 2018-09-07 | End: 2018-09-12

## 2018-09-07 RX ORDER — SODIUM CHLORIDE 0.9 % (FLUSH) 0.9 %
10 SYRINGE (ML) INJECTION PRN
Status: DISCONTINUED | OUTPATIENT
Start: 2018-09-07 | End: 2018-09-08

## 2018-09-07 RX ORDER — SODIUM CHLORIDE 0.9 % (FLUSH) 0.9 %
10 SYRINGE (ML) INJECTION EVERY 12 HOURS SCHEDULED
Status: DISCONTINUED | OUTPATIENT
Start: 2018-09-07 | End: 2018-09-08

## 2018-09-07 RX ORDER — ONDANSETRON 2 MG/ML
4 INJECTION INTRAMUSCULAR; INTRAVENOUS EVERY 8 HOURS PRN
Status: DISCONTINUED | OUTPATIENT
Start: 2018-09-07 | End: 2018-09-08

## 2018-09-07 RX ADMIN — CIPROFLOXACIN 400 MG: 2 INJECTION, SOLUTION INTRAVENOUS at 11:46

## 2018-09-07 RX ADMIN — SODIUM CHLORIDE: 9 INJECTION, SOLUTION INTRAVENOUS at 19:25

## 2018-09-07 RX ADMIN — ENOXAPARIN SODIUM 40 MG: 40 INJECTION SUBCUTANEOUS at 17:21

## 2018-09-07 RX ADMIN — SODIUM CHLORIDE: 9 INJECTION, SOLUTION INTRAVENOUS at 01:10

## 2018-09-07 RX ADMIN — Medication 10 ML: at 21:13

## 2018-09-07 RX ADMIN — METRONIDAZOLE 500 MG: 500 INJECTION, SOLUTION INTRAVENOUS at 17:21

## 2018-09-07 RX ADMIN — METRONIDAZOLE 500 MG: 500 INJECTION, SOLUTION INTRAVENOUS at 00:12

## 2018-09-07 RX ADMIN — MORPHINE SULFATE 2 MG: 2 INJECTION, SOLUTION INTRAMUSCULAR; INTRAVENOUS at 21:10

## 2018-09-07 RX ADMIN — METRONIDAZOLE 500 MG: 500 INJECTION, SOLUTION INTRAVENOUS at 06:58

## 2018-09-07 ASSESSMENT — ENCOUNTER SYMPTOMS
VOICE CHANGE: 0
TROUBLE SWALLOWING: 0

## 2018-09-07 ASSESSMENT — PAIN SCALES - GENERAL
PAINLEVEL_OUTOF10: 0
PAINLEVEL_OUTOF10: 5
PAINLEVEL_OUTOF10: 10

## 2018-09-07 ASSESSMENT — PAIN DESCRIPTION - PAIN TYPE: TYPE: ACUTE PAIN

## 2018-09-07 NOTE — ANESTHESIA PRE PROCEDURE
Department of Anesthesiology  Preprocedure Note       Name:  Raghu Singh   Age:  80 y.o.  :  1927                                          MRN:  [id number]         Date:  2018      Surgeon: Jennifer Lam):  Austen Diaz MD    Procedure: Procedure(s):  CHOLECYSTECTOMY LAPAROSCOPIC W/ GRAM, POSSIBLE OPEN    Medications prior to admission:   Prior to Admission medications    Medication Sig Start Date End Date Taking? Authorizing Provider   diclofenac sodium 1 % GEL Apply 2 g topically 2 times daily 18   Rashid Bell MD   Multiple Vitamins-Minerals [facility] COMPLETE PO) Take 1 tablet by mouth daily    Historical Provider, MD   amLODIPine (NORVASC) 5 MG tablet Take 1 tablet by mouth daily 18   Rashid Bell MD   ferrous sulfate (FE TABS) 325 (65 Fe) MG EC tablet Take 1 tablet by mouth 2 times daily 18   Rashid Bell MD   Lactobacillus (PROBIOTIC ACIDOPHILUS) TABS Take 1 tablet by mouth daily 18   Rashid Bell MD   Cranberry-Vitamin C-Inulin (UTI-STAT) LIQD Take by mouth    Historical Provider, MD   levothyroxine (SYNTHROID) 25 MCG tablet Take 1 tablet by mouth Daily 10/25/17   Rashid Bell MD   losartan (COZAAR) 100 MG tablet Take 1 tablet by mouth daily 10/25/17   Rashid Bell MD   acetaminophen 650 MG TABS Take 650 mg by mouth every 4 hours as needed. 14   Rashid Bell MD   melatonin 3 MG TABS tablet Take 3 mg by mouth nightly. Historical Provider, MD   multivitamin (OCUVITE) TABS per tablet Take 1 tablet by mouth daily.     Historical Provider, MD       Current medications:    Current Facility-Administered Medications   Medication Dose Route Frequency Provider Last Rate Last Dose    sodium chloride flush 0.9 % injection 10 mL  10 mL Intravenous 2 times per day Rashid Bell MD        sodium chloride flush 0.9 % injection 10 mL  10 mL Intravenous PRN Rashid Bell MD        enoxaparin (LOVENOX) injection 40 mg  40 mg Subcutaneous Daily Valerie Stanley MD   40 mg at 09/07/18 1721    ondansetron (ZOFRAN) injection 4 mg  4 mg Intravenous Q8H PRN Valerie Stanley MD        morphine injection 2 mg  2 mg Intravenous Q3H PRN Valerie Stanley MD        0.9 % sodium chloride infusion   Intravenous Continuous Ivett Suggs  mL/hr at 09/07/18 1925      sodium chloride flush 0.9 % injection 10 mL  10 mL Intravenous PRN Valentin Right, DO   10 mL at 09/06/18 2230    ciprofloxacin (CIPRO) IVPB 400 mg  400 mg Intravenous Q12H Jacques Kaplan MD   Stopped at 09/07/18 1250    metronidazole (FLAGYL) 500 mg in NaCl 100 mL IVPB premix  500 mg Intravenous Brianna Sanchez MD   Stopped at 09/07/18 1825       Allergies:     Allergies   Allergen Reactions    Ace Inhibitors Other (See Comments)    Sporanox [Itraconazole] Other (See Comments)       Problem List:    Patient Active Problem List   Diagnosis Code    Hypertension I10    Hyperlipidemia E78.5    Hard of hearing H91.90    Anemia D64.9    Hypothyroid E03.9    GERD (gastroesophageal reflux disease) K21.9    Diverticulosis K57.90    Urinary incontinence R32    Osteoarthritis M19.90    Leaky heart valve I38    Small bowel obstruction ([address] K56.609    Acute gallstone pancreatitis K85.10       Past Medical History:        Diagnosis Date    Anemia     Blood transfusion reaction     Diverticulosis     GERD (gastroesophageal reflux disease)     Hard of hearing     both ears, wears hearing aids    Hyperlipidemia     hx of    Hypertension     Hypothyroid     Leaky heart valve     Lentigo     Osteoarthritis     Palpitations     Temporal arteritis (HCC)     Urinary incontinence     Wears glasses     driving and reading    Wears partial dentures     lower       Past Surgical History:        Procedure Laterality Date    ABDOMEN SURGERY  7/2/14    removal of infected mesh w replacement with xen matrix mesh    APPENDECTOMY CMP:   Lab Results   Component Value Date     09/07/2018    K 4.1 09/07/2018     09/07/2018    CO2 26 09/07/2018    BUN 19 09/07/2018    CREATININE 0.72 09/07/2018    GFRAA >60 09/07/2018    LABGLOM >60 09/07/2018    GLUCOSE 105 09/07/2018    GLUCOSE 79 05/02/2012    PROT 6.4 09/07/2018    CALCIUM 8.6 09/07/2018    BILITOT 0.23 09/07/2018    ALKPHOS 58 09/07/2018    AST 29 09/07/2018    ALT 24 09/07/2018       POC Tests: No results for input(s): POCGLU, POCNA, POCK, POCCL, POCBUN, POCHEMO, POCHCT in the last 72 hours. Coags:   Lab Results   Component Value Date    PROTIME 12.3 06/28/2014    INR 1.2 06/28/2014    APTT 38.2 06/28/2014       HCG (If Applicable): No results found for: PREGTESTUR, PREGSERUM, HCG, HCGQUANT     ABGs:   Lab Results   Component Value Date    PO2ART 105.0 01/07/2013    SFO3DRK 20.4 01/07/2013    F6QPFJSC 95.6 01/07/2013        Type & Screen (If Applicable):  No results found for: LABABO, [address]    Anesthesia Evaluation  Patient summary reviewed and Nursing notes reviewed no history of anesthetic complications:   Airway: Mallampati: IV  TM distance: <3 FB   Neck ROM: limited  Mouth opening: > = 3 FB Dental: normal exam         Pulmonary:Negative Pulmonary ROS and normal exam                               Cardiovascular:    (+) hypertension:,                   Neuro/Psych:   Negative Neuro/Psych ROS              GI/Hepatic/Renal:   (+) GERD:,           Endo/Other:    (+) hypothyroidism::., .                 Abdominal:           Vascular:                                        Anesthesia Plan      general     ASA 2       Induction: intravenous. MIPS: Postoperative opioids intended and Prophylactic antiemetics administered. Anesthetic plan and risks discussed with patient.                       Jarod Guy MD   9/7/2018

## 2018-09-07 NOTE — H&P
HISTORY and [address]         NAME:  Oswaldo Coulter  MRN: [id number]   YOB: 1927   Date: 9/7/2018   Age: 80 y.o. Gender: female       COMPLAINT AND PRESENT HISTORY:      Oswaldo Coulter is 80 y.o.,  female, here for bowel obstruction and pancreatitis. Patient reports having severe generalized abdominal pain yesterday noon; she reported  her pain at 10/10. Patient also complained of nausea with dry heaving. Pt reports that she had her last BM the morning before. Pt with hx of C-diff in May & July of this year. No diarrhea or constipation. Patient denied CP or SOB. No urinary issues. No fever or chills. Last colonoscopy was 2 yrs ago,. CT abdomen revealed Findings consistent with a high-grade small bowel obstruction with  transition point within the anterior aspect of the lower pelvis at the level  of the proximal ileum. NGT placed. Labs revealing leukocytosis. IV antibiotics started. Elevated lipase and amylase consistent with pancreatitis. Pt placed NPO. General surgery consult.          DIAGNOSTIC RESULTS   Radiology:     Labs:  CBC:   Recent Labs      09/06/18 2130  09/07/18   0943   WBC  20.5*  10.6   HGB  11.6*  10.0*   PLT  325  317     BMP:    Recent Labs      09/06/18 2130 09/07/18   0943   NA  142  140   K  3.9  4.1   CL  102  105   CO2  26  26   BUN  20  19   CREATININE  0.71  0.72   GLUCOSE  184*  105*     Hepatic:   Recent Labs      09/06/18 2130  09/07/18   0943   AST  66*  29   ALT  31  24   BILITOT  0.45  0.23*   ALKPHOS  76  58       U/A:  Lab Results   Component Value Date    NITRITE neg 06/06/2018    COLORU YELLOW 09/06/2018    WBCUA 0 TO 2 09/06/2018    RBCUA 0 TO 2 09/06/2018    MUCUS 1+ 09/06/2018    BACTERIA None 09/06/2018    CLARITYU cloudy 06/06/2018    SPECGRAV 1.054 09/06/2018    LEUKOCYTESUR NEGATIVE 09/06/2018    BLOODU trace-intact 06/06/2018    GLUCOSEU NEGATIVE 09/06/2018    AMORPHOUS NOT REPORTED 09/06/2018       PAST MEDICAL melatonin 3 MG TABS tablet Take 3 mg by mouth nightly.  multivitamin (OCUVITE) TABS per tablet Take 1 tablet by mouth daily. General health:  Fairly good. No fever or chills. Skin:  No itching, redness or rash. Head, eyes, ears, nose, throat:  No headache, epistaxis, rhinorrhea hearing loss or sore throat. Neck:  No pain, stiffness or masses. Cardiovascular/Respiratory system:  No chest pain, palpitation, shortness of breath, coughing or expectoration. Gastrointestinal tract: See HPI. Genitourinary:  No burning on micturition. No hesitancy, urgency, frequency or discoloration of urine. Locomotor:  No bone or joint pains. No swelling or deformities. Neuropsychiatric:  No referable complaints. GENERAL PHYSICAL EXAM:         Vitals: BP (!) 123/35   Pulse 82   Temp 98.3 °F (36.8 °C) (Oral)   Resp 16   Ht 5' (1.524 m)   Wt 145 lb (65.8 kg)   SpO2 93%   BMI 28.32 kg/m²  Body mass index is 28.32 kg/m². GENERAL APPEARANCE:  Tana Michael is 80 y.o.,  female, mildly obese, nourished, conscious, alert. Does not appear to be distress or pain at this time. SKIN:  Warm, dry, no cyanosis or jaundice. HEAD:  Normocephalic, atraumatic, no swelling or tenderness. EYES:  Pupils equal, reactive to light, Conjunctiva is clear, EOMs intact [place]. eyelids WNL. EARS:  No discharge. [employer] [place]. NOSE:  No rhinorrhea, epistaxis or septal deformity. n/g down left nares. THROAT:  Not congested. No ulceration bleeding or discharge. NECK:  No stiffness, trachea central.  No palpable masses or L.N.      CHEST:  Symmetrical and equal on expansion. HEART:  Regular rate and rhythm. S1 > S2, No audible murmurs or gallops. LUNGS:  Equal on expansion, normal breath sounds. No adventitious sounds. ABDOMEN:  Mildly obese. Soft on palpation. No localized tenderness. No guarding or rigidity.  No palpable point to decompressed distal small bowel loops  within the lower abdomen at the level of the proximal ileum.  The transition  point is adjacent to ventral surgical mesh.  Distal ileal loops are  decompressed.  There is no free intraperitoneal air identified.  Marked  diverticulosis is noted throughout the colon.     Pelvis: The urinary bladder is normal in appearance.  There is no pelvic mass  identified.     Peritoneum/Retroperitoneum: There is no pathologically enlarged  lymphadenopathy present.  Severe atherosclerotic calcifications are present  within the abdominal aorta and proximal iliac arteries.     Bones/Soft Tissues: No lytic or blastic osseous lesions are identified.        Impression  1. Findings consistent with a high-grade small bowel obstruction with  transition point within the anterior aspect of the lower pelvis at the level  of the proximal ileum.  The transition point is adjacent to ventral surgical  mesh and is likely the result of adhesions. 2. No free intraperitoneal air identified. 3. Atrophic appearance of the kidneys suggesting chronic pancreatitis. 4. Gallbladder distention without gallbladder wall thickening or  cholelithiasis evident. PROVISIONAL DIAGNOSES:      Principal Problem:    Acute pancreatitis without infection or necrosis  Active Problems:    Small bowel obstruction (HCC)  Resolved Problems:    * No resolved hospital problems.  *      CHRIS CLAY, CHANDU - CNP on 9/7/2018 at 1:23 PM

## 2018-09-07 NOTE — CONSULTS
General Surgery   History and Physical      PATIENT NAME: Darlene Her OF BIRTH: 5/5/1927    ADMISSION DATE: 9/6/2018  9:07 PM      TODAY'S DATE: 9/7/2018    CHIEF COMPLAINT:  Epigastric pain      HISTORY OF PRESENT ILLNESS:  The patient is a 80 y.o. female  who presents with a 48 hour history of severe epigastric boring pain into her back. She had pain of 10 out of 10 upon [id number].  It is now a 0 out of 10. It was sharp and stabbing. She has an NG tube in that is draining scant bilious. She was nauseated upon arrival, but now has no nausea. She had a small formed stool yesterday. She does not drink alcohol. She denies any fever, chills, or sweats. She denies any weight loss.     Past Medical History:        Diagnosis Date    Anemia     Blood transfusion reaction     Diverticulosis     GERD (gastroesophageal reflux disease)     Hard of hearing     both ears, wears hearing aids    Hyperlipidemia     hx of    Hypertension     Hypothyroid     Leaky heart valve     Lentigo     Osteoarthritis     Palpitations     Temporal arteritis (HCC)     Urinary incontinence     Wears glasses     driving and reading    Wears partial dentures     lower       Past Surgical History:        Procedure Laterality Date    ABDOMEN SURGERY  7/2/14    removal of infected mesh w replacement with xen matrix mesh    APPENDECTOMY      BLADDER SUSPENSION      CARPAL TUNNEL RELEASE Bilateral     right in 2014, left in 2017   [address] Bilateral     COLONOSCOPY      multiple    GASTRIC FUNDOPLICATION      with exploratory laparoscopy    GASTROSTOMY TUBE PLACEMENT  7/2/14    Removed end of July    HERNIA REPAIR  6/4/14    ventral hernia repair    HIATAL HERNIA REPAIR      HYSTERECTOMY      with unilateral salpingo-oopherectomy    MOHS SURGERY Right 11/28/2016    Dr. [place]    TX EGD TRANSORAL BIOPSY SINGLE/MULTIPLE N/A 7/6/2017    EGD BIOPSY performed by Shereen Mcclendon MD at [place] [id number] present, no masses and no hernias  MUSCULOSKELETAL:  negative, Spine range of motion normal. Muscular strength intact. , there is not obvious somatic dysfunction, gait normal  NEUROLOGIC:  Mental Status Exam:  Level of Alertness:   alert  Orientation:   oriented to person, place, and time  pshych judgement and insight is normal  Rectal: deferred  Skin: no rashes, lesions, or ulcers    CBC:   Lab Results   Component Value Date    WBC 10.6 09/07/2018    RBC 3.49 09/07/2018    RBC 3.81 05/02/2012    HGB 10.0 09/07/2018    HCT 30.3 09/07/2018    MCV 87.0 09/07/2018    MCH 28.6 09/07/2018    MCHC 32.9 09/07/2018    RDW 15.9 09/07/2018     09/07/2018     05/02/2012    MPV 7.7 09/07/2018     CMP:    Lab Results   Component Value Date     09/07/2018    K 4.1 09/07/2018     09/07/2018    CO2 26 09/07/2018    BUN 19 09/07/2018    CREATININE 0.72 09/07/2018    GFRAA >60 09/07/2018    LABGLOM >60 09/07/2018    GLUCOSE 105 09/07/2018    GLUCOSE 79 05/02/2012    PROT 6.4 09/07/2018    LABALBU 3.4 09/07/2018    LABALBU 3.8 05/02/2012    CALCIUM 8.6 09/07/2018    BILITOT 0.23 09/07/2018    ALKPHOS 58 09/07/2018    AST 29 09/07/2018    ALT 24 09/07/2018     PT/INR:    Lab Results   Component Value Date    PROTIME 12.3 06/28/2014    INR 1.2 06/28/2014     AMYLASE:    Lab Results   Component Value Date    AMYLASE 263 09/07/2018     LIPASE:    Lab Results   Component Value Date    LIPASE 266 09/07/2018       Pertinent Radiology:   Daphne Richardson [[id number]] Collected: 09/07/18 1327   Updated: 09/07/18 1357    Narrative:     EXAMINATION:  RIGHT UPPER QUADRANT ULTRASOUND, 9/7/2018 1:19 pm    COMPARISON:  None    HISTORY:  ORDERING SYSTEM PROVIDED HISTORY: PANCREATITIS - Acute  TECHNOLOGIST PROVIDED HISTORY:  Ordering Physician Provided Reason for Exam: Pancreatitis  Acuity: Acute  Type of Exam: Initial    FINDINGS:  LIVER:  Visualization of the liver is suboptimal due to bowel gas and body  habitus.  No space-occupying mass lesions or intrahepatic biliary ductal  dilatation is noted. BILIARY SYSTEM:  The gallbladder is distended measuring 12.6 x 4.8 cm. Gallbladder wall thickening is noted 11.3 mm.  Gallbladder wall thickening is  asymmetric.  Sludge is present in the gallbladder.  There is no evidence of a  positive sonographic Loving's sign.  Common bile duct is within normal limits  measuring 5.9 mm.    RIGHT KIDNEY: The right kidney is grossly unremarkable without evidence of  hydronephrosis. PANCREAS:  The pancreas is obscured by bowel gas and cannot be accurately  assessed. OTHER: No evidence of right upper quadrant ascites. Impression:     Distended gallbladder containing sludge.  Gallbladder wall thickening,  asymmetric is noted without pericholecystic fluid.  Gallbladder malignancy  cannot be excluded given the asymmetric wall thickening.  No common bile duct  dilatation.  Poor visualization of the liver.  Pancreas is obscured by bowel  gas and cannot be accurately assessed. CT ABDOMEN PELVIS W IV CONTRAST [[id number]] Collected: 09/06/18 2232   Updated: 09/07/18 0122    Narrative:     EXAMINATION:  CT OF THE ABDOMEN AND PELVIS WITH CONTRAST 9/6/2018 10:31 pm    TECHNIQUE:  CT of the abdomen and pelvis was performed with the administration of  intravenous contrast. Multiplanar reformatted images are provided for review. Dose modulation, iterative reconstruction, and/or weight based adjustment of  the mA/kV was utilized to reduce the radiation dose to as low as reasonably  achievable. COMPARISON:  05/02/2018    HISTORY:  ORDERING SYSTEM PROVIDED HISTORY: abdominal pain, vomiting  TECHNOLOGIST PROVIDED HISTORY:  IV Only Contrast  Ordering Physician Provided Reason for Exam: Abdominal Pain with  Nausea/Vomiting. Hx - Diverticulosis and Urinary incontinence.   Acuity: Unknown  Type of Exam: Unknown  Relevant Medical/Surgical History: Surgical hx - Appy, bladder suspension,  hysterectomy, hernia repair (Hital/ventral),  abdominal mesh and  ileostomy . FINDINGS:  Lower Chest: The lung bases are clear.  There are no pleural or pericardial  effusions evident. Organs: The gallbladder is distended measuring 5.6 cm in transverse  dimension.  There is no gallbladder wall thickening evident.  The liver,  spleen and adrenal glands are normal in appearance.  Pancreas is atrophic. Dilatation of the pancreatic duct is noted suggesting chronic pancreatitis. The kidneys are normal in appearance. GI/Bowel: The stomach is distended. [place] is marked proximal small bowel  distension with a transition point to decompressed distal small bowel loops  within the lower abdomen at the level of the proximal ileum.  The transition  point is adjacent to ventral surgical mesh.  Distal ileal loops are  decompressed.  There is no free intraperitoneal air identified.  Marked  diverticulosis is noted throughout the colon. Pelvis: The urinary bladder is normal in appearance.  There is no pelvic mass  identified. Peritoneum/Retroperitoneum: There is no pathologically enlarged  lymphadenopathy present.  Severe atherosclerotic calcifications are present  within the abdominal aorta and proximal iliac arteries. Bones/Soft Tissues: No lytic or blastic osseous lesions are identified. Impression:     1. Findings consistent with a high-grade small bowel obstruction with  transition point within the anterior aspect of the lower pelvis at the level  of the proximal ileum.  The transition point is adjacent to ventral surgical  mesh and is likely the result of adhesions. 2. No free intraperitoneal air identified. 3. Atrophic appearance of the kidneys suggesting chronic pancreatitis. 4. Gallbladder distention without gallbladder wall thickening or  cholelithiasis evident.            ASSESSMENT   Principal Problem:    Acute gallstone pancreatitis  Active Problems:    Hypertension    Hyperlipidemia    Hard of hearing

## 2018-09-07 NOTE — PROGRESS NOTES
Progress Note  9/7/2018 9:34 AM  Subjective:   Admit Date: 9/6/2018  PCP: Awais Hussein MD  Interval History: Pt admitted with bloating, abd pain, dry heaves- found to have SBO as well as pancreatitis. Feeling better this morning. Diet: Diet NPO Effective Now  Medications:   Scheduled Meds:   sodium chloride flush  10 mL Intravenous 2 times per day    enoxaparin  40 mg Subcutaneous Daily    ciprofloxacin  400 mg Intravenous Q12H    metroNIDAZOLE  500 mg Intravenous Q8H     Continuous Infusions:   sodium chloride 75 mL/hr at 09/07/18 0110     CBC:   Recent Labs      09/06/18 2130   WBC  20.5*   HGB  11.6*   PLT  325     BMP:    Recent Labs      09/06/18 2130   NA  142   K  3.9   CL  102   CO2  26   BUN  20   CREATININE  0.71   GLUCOSE  184*     Hepatic:   Recent Labs      09/06/18 2130   AST  66*   ALT  31   BILITOT  0.45   ALKPHOS  76     Troponin: No results for input(s): TROPONINI in the last 72 hours. BNP: No results for input(s): BNP in the last 72 hours. Lipids: No results for input(s): CHOL, HDL in the last 72 hours. Invalid input(s): LDLCALCU  INR: No results for input(s): INR in the last 72 hours. Objective:   Vitals: BP (!) 123/35   Pulse 82   Temp 98.3 °F (36.8 °C) (Oral)   Resp 16   Ht 5' (1.524 m)   Wt 145 lb (65.8 kg)   SpO2 93%   BMI 28.32 kg/m²   General appearance: alert and cooperative with exam  Neck: supple, symmetrical, trachea midline  Lungs: clear to auscultation bilaterally  Heart: regular rate and rhythm, S1, S2 normal, no murmur, click, rub or gallop  Abdomen: soft, non-tender; bowel sounds normal; no masses,  no organomegaly  Extremities: extremities normal, atraumatic, no cyanosis or edema  Neurologic: Mental status: Alert, oriented, thought content appropriate    Assessment and Plan:   1. Pancreatitis- check GB u/s, heather labs  2. SBO- ?actual ileus due to pancreatitis? Continue NGT and IVF.    3. HTN- lopressor if needed    Patient Active Problem

## 2018-09-07 NOTE — PLAN OF CARE
Problem: Falls - Risk of:  Goal: Will remain free from falls  Will remain free from falls   Outcome: Ongoing  Patient calls out for assistance. Up with assistance. Problem: Risk for Impaired Skin Integrity  Goal: Tissue integrity - skin and mucous membranes  Structural intactness and normal physiological function of skin and  mucous membranes. Outcome: Ongoing  Patient able to turn self. No new breakdown noted. Problem: Pain:  Goal: Pain level will decrease  Pain level will decrease   Outcome: Ongoing  Patient complains of minimal pain this shift.

## 2018-09-07 NOTE — ED PROVIDER NOTES
10/25/17   Shakira Berumen MD   acetaminophen 650 MG TABS Take 650 mg by mouth every 4 hours as needed. 7/9/14   Shakira Berumen MD   melatonin 3 MG TABS tablet Take 3 mg by mouth nightly. Historical Provider, MD   multivitamin (OCUVITE) TABS per tablet Take 1 tablet by mouth daily. Historical Provider, MD       REVIEW OF SYSTEMS    (2-9 systems for level 4, 10 or more for level 5)      Review of Systems   Constitutional: Negative for chills, fatigue and fever. HENT: Negative for trouble swallowing and voice change. Eyes: Negative for visual disturbance. Respiratory: Negative for shortness of breath. Cardiovascular: Negative for chest pain. Gastrointestinal: Positive for abdominal pain, constipation, nausea and vomiting. Negative for diarrhea. Genitourinary: Negative for dysuria, hematuria and urgency. Musculoskeletal: Negative for myalgias. Skin: Negative for rash and wound. Neurological: Negative for dizziness, weakness, light-headedness and numbness. PHYSICAL EXAM   (up to 7 for level 4, 8 or more for level 5)      INITIAL VITALS:   BP (!) 161/57   Pulse 88   Temp 97.6 °F (36.4 °C) (Oral)   Resp 19   Ht 5' (1.524 m)   Wt 145 lb (65.8 kg)   SpO2 93%   BMI 28.32 kg/m²     Physical Exam   Constitutional: She is oriented to person, place, and time. She appears well-developed and well-nourished. HENT:   Head: Normocephalic and atraumatic. Mouth/Throat: Oropharynx is clear and moist.   Eyes: Pupils are equal, round, and reactive to light. Conjunctivae and EOM are normal.   Cardiovascular: Normal rate and normal heart sounds. No murmur heard. Pulmonary/Chest: Effort normal and breath sounds normal. No respiratory distress. She has no wheezes. She has no rales. Abdominal: She exhibits distension. She exhibits no mass. There is tenderness. There is no rebound and no guarding.    Midline abdominal scar, diffuse tenderness to palpation   Musculoskeletal: Normal %    MCV 86.9 80 - 100 fL    MCH 28.0 26 - 34 pg    MCHC 32.2 31 - 37 g/dL    RDW 16.1 (H) 11.5 - 14.9 %    Platelets 016 104 - 437 k/uL    MPV 7.7 6.0 - 12.0 fL    NRBC Automated NOT REPORTED per 100 WBC    Differential Type NOT REPORTED     Immature Granulocytes NOT REPORTED 0 %    Absolute Immature Granulocyte NOT REPORTED 0.00 - 0.30 k/uL    WBC Morphology NOT REPORTED     RBC Morphology NOT REPORTED     Platelet Estimate NOT REPORTED     Seg Neutrophils 94 (H) 36 - 66 %    Lymphocytes 3 (L) 24 - 44 %    Monocytes 3 1 - 7 %    Eosinophils % 0 0 - 4 %    Basophils 0 0 - 2 %    Segs Absolute 19.26 (H) 1.3 - 9.1 k/uL    Absolute Lymph # 0.62 (L) 1.0 - 4.8 k/uL    Absolute Mono # 0.62 0.1 - 1.3 k/uL    Absolute Eos # 0.00 0.0 - 0.4 k/uL    Basophils # 0.00 0.0 - 0.2 k/uL    Morphology ANISOCYTOSIS PRESENT    Comprehensive Metabolic Panel   Result Value Ref Range    Glucose 184 (H) 70 - 99 mg/dL    BUN 20 8 - 23 mg/dL    CREATININE 0.71 0.50 - 0.90 mg/dL    Bun/Cre Ratio NOT REPORTED 9 - 20    Calcium 10.1 8.6 - 10.4 mg/dL    Sodium 142 135 - 144 mmol/L    Potassium 3.9 3.7 - 5.3 mmol/L    Chloride 102 98 - 107 mmol/L    CO2 26 20 - 31 mmol/L    Anion Gap 14 9 - 17 mmol/L    Alkaline Phosphatase 76 35 - 104 U/L    ALT 31 5 - 33 U/L    AST 66 (H) <32 U/L    Total Bilirubin 0.45 0.3 - 1.2 mg/dL    Total Protein 8.3 6.4 - 8.3 g/dL    Alb 4.1 3.5 - 5.2 g/dL    Albumin/Globulin Ratio NOT REPORTED 1.0 - 2.5    GFR Non-African American >60 >60 mL/min    GFR African American >60 >60 mL/min    GFR Comment          GFR Staging NOT REPORTED    Lipase   Result Value Ref Range    Lipase 1,290 (H) 13 - 60 U/L   Lactic Acid, Plasma   Result Value Ref Range    Lactic Acid 1.6 0.5 - 2.2 mmol/L    Lactic Acid, Whole Blood NOT REPORTED 0.7 - 2.1 mmol/L   Urinalysis Reflex to Culture   Result Value Ref Range    Color, UA YELLOW YEL    Turbidity UA CLEAR CLEAR    Glucose, Ur NEGATIVE NEG    Bilirubin Urine NEGATIVE NEG    Ketones, Urine NEGATIVE NEG    Specific Gravity, UA 1.054 (H) 1.000 - 1.030    Urine Hgb NEGATIVE NEG    pH, UA 6.5 5.0 - 8.0    Protein, UA TRACE (A) NEG    Urobilinogen, Urine Normal NORM    Nitrite, Urine NEGATIVE NEG    Leukocyte Esterase, Urine NEGATIVE NEG    Urinalysis Comments NOT REPORTED    Microscopic Urinalysis   Result Value Ref Range    -          WBC, UA 0 TO 2 /HPF    RBC, UA 0 TO 2 /HPF    Casts UA NOT REPORTED /LPF    Crystals UA NOT REPORTED NONE /HPF    Epithelial Cells UA 0 TO 2 /HPF    Renal Epithelial, Urine NOT REPORTED 0 /HPF    Bacteria, UA None NONE    Mucus, UA 1+ (A) NONE    Trichomonas, UA NOT REPORTED NONE    Amorphous, UA NOT REPORTED NONE    Other Observations UA NOT REPORTED NREQ    Yeast, UA NOT REPORTED NONE       IMPRESSION: Pancreatitis, bowel obstruction    RADIOLOGY:  Ct Abdomen Pelvis W Iv Contrast    Result Date: 9/6/2018  EXAMINATION: CT OF THE ABDOMEN AND PELVIS WITH CONTRAST 9/6/2018 10:31 pm TECHNIQUE: CT of the abdomen and pelvis was performed with the administration of intravenous contrast. Multiplanar reformatted images are provided for review. Dose modulation, iterative reconstruction, and/or weight based adjustment of the mA/kV was utilized to reduce the radiation dose to as low as reasonably achievable. COMPARISON: 05/02/2018 HISTORY: ORDERING SYSTEM PROVIDED HISTORY: abdominal pain, vomiting TECHNOLOGIST PROVIDED HISTORY: IV Only Contrast Ordering Physician Provided Reason for Exam: Abdominal Pain with Nausea/Vomiting. Hx - Diverticulosis and Urinary incontinence. Acuity: Unknown Type of Exam: Unknown Relevant Medical/Surgical History: Surgical hx - Appy, bladder suspension, hysterectomy, hernia repair (Hital/ventral),  abdominal mesh and  ileostomy . FINDINGS: Lower Chest: The lung bases are clear. There are no pleural or pericardial effusions evident. Organs: The gallbladder is distended measuring 5.6 cm in transverse dimension.   There is no gallbladder wall thickening

## 2018-09-07 NOTE — FLOWSHEET NOTE
09/07/18 0945   Encounter Summary   Services provided to: Patient   Referral/Consult From: [facility]   Continue Visiting (9/7/18)   Complexity of Encounter Low   Length of Encounter 15 minutes   Spiritual/Quaker   Type Ritual   Intervention Anointing   Sacraments   Sacrament of Sick-Anointing Anointed  (Fr Osman 9/7/18)

## 2018-09-08 ENCOUNTER — APPOINTMENT (OUTPATIENT)
Dept: GENERAL RADIOLOGY | Age: 83
DRG: 415 | End: 2018-09-08
Payer: MEDICARE

## 2018-09-08 ENCOUNTER — ANESTHESIA (OUTPATIENT)
Dept: OPERATING ROOM | Age: 83
DRG: 415 | End: 2018-09-08
Payer: MEDICARE

## 2018-09-08 VITALS — SYSTOLIC BLOOD PRESSURE: 206 MMHG | DIASTOLIC BLOOD PRESSURE: 85 MMHG | OXYGEN SATURATION: 93 % | TEMPERATURE: 99.5 F

## 2018-09-08 LAB
ALBUMIN SERPL-MCNC: 3.3 G/DL (ref 3.5–5.2)
ALBUMIN/GLOBULIN RATIO: ABNORMAL (ref 1–2.5)
ALP BLD-CCNC: 53 U/L (ref 35–104)
ALT SERPL-CCNC: 18 U/L (ref 5–33)
AMYLASE: 88 U/L (ref 28–100)
ANION GAP SERPL CALCULATED.3IONS-SCNC: 12 MMOL/L (ref 9–17)
AST SERPL-CCNC: 18 U/L
BILIRUB SERPL-MCNC: 0.2 MG/DL (ref 0.3–1.2)
BUN BLDV-MCNC: 13 MG/DL (ref 8–23)
BUN/CREAT BLD: ABNORMAL (ref 9–20)
CALCIUM SERPL-MCNC: 8.3 MG/DL (ref 8.6–10.4)
CHLORIDE BLD-SCNC: 108 MMOL/L (ref 98–107)
CO2: 22 MMOL/L (ref 20–31)
CREAT SERPL-MCNC: 0.75 MG/DL (ref 0.5–0.9)
EKG ATRIAL RATE: 90 BPM
EKG P AXIS: 64 DEGREES
EKG P-R INTERVAL: 152 MS
EKG Q-T INTERVAL: 358 MS
EKG QRS DURATION: 92 MS
EKG QTC CALCULATION (BAZETT): 437 MS
EKG R AXIS: 13 DEGREES
EKG T AXIS: 8 DEGREES
EKG VENTRICULAR RATE: 90 BPM
GFR AFRICAN AMERICAN: >60 ML/MIN
GFR NON-AFRICAN AMERICAN: >60 ML/MIN
GFR SERPL CREATININE-BSD FRML MDRD: ABNORMAL ML/MIN/{1.73_M2}
GFR SERPL CREATININE-BSD FRML MDRD: ABNORMAL ML/MIN/{1.73_M2}
GLUCOSE BLD-MCNC: 92 MG/DL (ref 70–99)
HCT VFR BLD CALC: 29.4 % (ref 36–46)
HEMOGLOBIN: 9.7 G/DL (ref 12–16)
LIPASE: 48 U/L (ref 13–60)
MCH RBC QN AUTO: 28.7 PG (ref 26–34)
MCHC RBC AUTO-ENTMCNC: 32.9 G/DL (ref 31–37)
MCV RBC AUTO: 87.4 FL (ref 80–100)
NRBC AUTOMATED: ABNORMAL PER 100 WBC
PDW BLD-RTO: 15.8 % (ref 11.5–14.9)
PLATELET # BLD: 269 K/UL (ref 150–450)
PMV BLD AUTO: 7.6 FL (ref 6–12)
POTASSIUM SERPL-SCNC: 4 MMOL/L (ref 3.7–5.3)
RBC # BLD: 3.36 M/UL (ref 4–5.2)
SODIUM BLD-SCNC: 142 MMOL/L (ref 135–144)
TOTAL PROTEIN: 6.3 G/DL (ref 6.4–8.3)
WBC # BLD: 9.6 K/UL (ref 3.5–11)

## 2018-09-08 PROCEDURE — 6360000002 HC RX W HCPCS: Performed by: ANESTHESIOLOGY

## 2018-09-08 PROCEDURE — 2709999900 HC NON-CHARGEABLE SUPPLY: Performed by: SURGERY

## 2018-09-08 PROCEDURE — 3600000012 HC SURGERY LEVEL 2 ADDTL 15MIN: Performed by: SURGERY

## 2018-09-08 PROCEDURE — 36415 COLL VENOUS BLD VENIPUNCTURE: CPT

## 2018-09-08 PROCEDURE — 2580000003 HC RX 258: Performed by: SURGERY

## 2018-09-08 PROCEDURE — 3600000002 HC SURGERY LEVEL 2 BASE: Performed by: SURGERY

## 2018-09-08 PROCEDURE — 2500000003 HC RX 250 WO HCPCS: Performed by: STUDENT IN AN ORGANIZED HEALTH CARE EDUCATION/TRAINING PROGRAM

## 2018-09-08 PROCEDURE — 0FT40ZZ RESECTION OF GALLBLADDER, OPEN APPROACH: ICD-10-PCS | Performed by: SURGERY

## 2018-09-08 PROCEDURE — 88304 TISSUE EXAM BY PATHOLOGIST: CPT

## 2018-09-08 PROCEDURE — 85027 COMPLETE CBC AUTOMATED: CPT

## 2018-09-08 PROCEDURE — 82150 ASSAY OF AMYLASE: CPT

## 2018-09-08 PROCEDURE — 6360000002 HC RX W HCPCS: Performed by: STUDENT IN AN ORGANIZED HEALTH CARE EDUCATION/TRAINING PROGRAM

## 2018-09-08 PROCEDURE — 93005 ELECTROCARDIOGRAM TRACING: CPT

## 2018-09-08 PROCEDURE — 3700000000 HC ANESTHESIA ATTENDED CARE: Performed by: SURGERY

## 2018-09-08 PROCEDURE — BF101ZZ FLUOROSCOPY OF BILE DUCTS USING LOW OSMOLAR CONTRAST: ICD-10-PCS | Performed by: SURGERY

## 2018-09-08 PROCEDURE — 3700000001 HC ADD 15 MINUTES (ANESTHESIA): Performed by: SURGERY

## 2018-09-08 PROCEDURE — 2500000003 HC RX 250 WO HCPCS: Performed by: ANESTHESIOLOGY

## 2018-09-08 PROCEDURE — 6360000002 HC RX W HCPCS: Performed by: FAMILY MEDICINE

## 2018-09-08 PROCEDURE — 7100000001 HC PACU RECOVERY - ADDTL 15 MIN: Performed by: SURGERY

## 2018-09-08 PROCEDURE — 99233 SBSQ HOSP IP/OBS HIGH 50: CPT | Performed by: FAMILY MEDICINE

## 2018-09-08 PROCEDURE — 2500000003 HC RX 250 WO HCPCS: Performed by: SURGERY

## 2018-09-08 PROCEDURE — 80053 COMPREHEN METABOLIC PANEL: CPT

## 2018-09-08 PROCEDURE — 7100000000 HC PACU RECOVERY - FIRST 15 MIN: Performed by: SURGERY

## 2018-09-08 PROCEDURE — 2580000003 HC RX 258: Performed by: ANESTHESIOLOGY

## 2018-09-08 PROCEDURE — C1726 CATH, BAL DIL, NON-VASCULAR: HCPCS | Performed by: SURGERY

## 2018-09-08 PROCEDURE — 6360000002 HC RX W HCPCS: Performed by: SURGERY

## 2018-09-08 PROCEDURE — L0625 LO FLEX L1-BELOW L5 PRE OTS: HCPCS | Performed by: SURGERY

## 2018-09-08 PROCEDURE — 6370000000 HC RX 637 (ALT 250 FOR IP): Performed by: SURGERY

## 2018-09-08 PROCEDURE — 74300 X-RAY BILE DUCTS/PANCREAS: CPT

## 2018-09-08 PROCEDURE — 73560 X-RAY EXAM OF KNEE 1 OR 2: CPT

## 2018-09-08 PROCEDURE — 6360000004 HC RX CONTRAST MEDICATION: Performed by: SURGERY

## 2018-09-08 PROCEDURE — 6370000000 HC RX 637 (ALT 250 FOR IP): Performed by: FAMILY MEDICINE

## 2018-09-08 PROCEDURE — 83690 ASSAY OF LIPASE: CPT

## 2018-09-08 PROCEDURE — 2060000000 HC ICU INTERMEDIATE R&B

## 2018-09-08 RX ORDER — GLYCOPYRROLATE 1 MG/5 ML
SYRINGE (ML) INTRAVENOUS PRN
Status: DISCONTINUED | OUTPATIENT
Start: 2018-09-08 | End: 2018-09-08 | Stop reason: SDUPTHER

## 2018-09-08 RX ORDER — MORPHINE SULFATE 2 MG/ML
1 INJECTION, SOLUTION INTRAMUSCULAR; INTRAVENOUS EVERY 5 MIN PRN
Status: DISCONTINUED | OUTPATIENT
Start: 2018-09-08 | End: 2018-09-08 | Stop reason: HOSPADM

## 2018-09-08 RX ORDER — OXYCODONE HYDROCHLORIDE AND ACETAMINOPHEN 5; 325 MG/1; MG/1
1 TABLET ORAL PRN
Status: DISCONTINUED | OUTPATIENT
Start: 2018-09-08 | End: 2018-09-08 | Stop reason: HOSPADM

## 2018-09-08 RX ORDER — VITS A,C,E/LUTEIN/MINERALS 300MCG-200
1 TABLET ORAL DAILY
Status: DISCONTINUED | OUTPATIENT
Start: 2018-09-08 | End: 2018-09-12 | Stop reason: HOSPADM

## 2018-09-08 RX ORDER — OXYCODONE HYDROCHLORIDE AND ACETAMINOPHEN 5; 325 MG/1; MG/1
1 TABLET ORAL EVERY 4 HOURS PRN
Status: DISCONTINUED | OUTPATIENT
Start: 2018-09-08 | End: 2018-09-09

## 2018-09-08 RX ORDER — NEOSTIGMINE METHYLSULFATE 1 MG/ML
INJECTION, SOLUTION INTRAVENOUS PRN
Status: DISCONTINUED | OUTPATIENT
Start: 2018-09-08 | End: 2018-09-08 | Stop reason: SDUPTHER

## 2018-09-08 RX ORDER — LACTOBACILLUS RHAMNOSUS GG 10B CELL
1 CAPSULE ORAL DAILY
Status: DISCONTINUED | OUTPATIENT
Start: 2018-09-08 | End: 2018-09-12 | Stop reason: HOSPADM

## 2018-09-08 RX ORDER — OXYCODONE HYDROCHLORIDE AND ACETAMINOPHEN 5; 325 MG/1; MG/1
2 TABLET ORAL PRN
Status: DISCONTINUED | OUTPATIENT
Start: 2018-09-08 | End: 2018-09-08 | Stop reason: HOSPADM

## 2018-09-08 RX ORDER — FENTANYL CITRATE 50 UG/ML
INJECTION, SOLUTION INTRAMUSCULAR; INTRAVENOUS PRN
Status: DISCONTINUED | OUTPATIENT
Start: 2018-09-08 | End: 2018-09-08 | Stop reason: SDUPTHER

## 2018-09-08 RX ORDER — PROMETHAZINE HYDROCHLORIDE 25 MG/ML
6.25 INJECTION, SOLUTION INTRAMUSCULAR; INTRAVENOUS
Status: DISCONTINUED | OUTPATIENT
Start: 2018-09-08 | End: 2018-09-08 | Stop reason: HOSPADM

## 2018-09-08 RX ORDER — MORPHINE SULFATE 2 MG/ML
2 INJECTION, SOLUTION INTRAMUSCULAR; INTRAVENOUS
Status: DISCONTINUED | OUTPATIENT
Start: 2018-09-08 | End: 2018-09-09

## 2018-09-08 RX ORDER — DIPHENHYDRAMINE HYDROCHLORIDE 50 MG/ML
12.5 INJECTION INTRAMUSCULAR; INTRAVENOUS
Status: DISCONTINUED | OUTPATIENT
Start: 2018-09-08 | End: 2018-09-08 | Stop reason: HOSPADM

## 2018-09-08 RX ORDER — MEPERIDINE HYDROCHLORIDE 50 MG/ML
12.5 INJECTION INTRAMUSCULAR; INTRAVENOUS; SUBCUTANEOUS EVERY 5 MIN PRN
Status: DISCONTINUED | OUTPATIENT
Start: 2018-09-08 | End: 2018-09-08 | Stop reason: HOSPADM

## 2018-09-08 RX ORDER — FENTANYL CITRATE 50 UG/ML
25 INJECTION, SOLUTION INTRAMUSCULAR; INTRAVENOUS EVERY 5 MIN PRN
Status: DISCONTINUED | OUTPATIENT
Start: 2018-09-08 | End: 2018-09-08 | Stop reason: HOSPADM

## 2018-09-08 RX ORDER — BUPIVACAINE HYDROCHLORIDE 5 MG/ML
INJECTION, SOLUTION EPIDURAL; INTRACAUDAL PRN
Status: DISCONTINUED | OUTPATIENT
Start: 2018-09-08 | End: 2018-09-08 | Stop reason: HOSPADM

## 2018-09-08 RX ORDER — OXYCODONE HYDROCHLORIDE AND ACETAMINOPHEN 5; 325 MG/1; MG/1
2 TABLET ORAL EVERY 4 HOURS PRN
Status: DISCONTINUED | OUTPATIENT
Start: 2018-09-08 | End: 2018-09-09

## 2018-09-08 RX ORDER — ONDANSETRON 2 MG/ML
INJECTION INTRAMUSCULAR; INTRAVENOUS PRN
Status: DISCONTINUED | OUTPATIENT
Start: 2018-09-08 | End: 2018-09-08 | Stop reason: SDUPTHER

## 2018-09-08 RX ORDER — KETOROLAC TROMETHAMINE 30 MG/ML
15 INJECTION, SOLUTION INTRAMUSCULAR; INTRAVENOUS ONCE
Status: COMPLETED | OUTPATIENT
Start: 2018-09-08 | End: 2018-09-08

## 2018-09-08 RX ORDER — MORPHINE SULFATE 4 MG/ML
4 INJECTION, SOLUTION INTRAMUSCULAR; INTRAVENOUS
Status: DISCONTINUED | OUTPATIENT
Start: 2018-09-08 | End: 2018-09-09

## 2018-09-08 RX ORDER — DEXAMETHASONE SODIUM PHOSPHATE 4 MG/ML
INJECTION, SOLUTION INTRA-ARTICULAR; INTRALESIONAL; INTRAMUSCULAR; INTRAVENOUS; SOFT TISSUE PRN
Status: DISCONTINUED | OUTPATIENT
Start: 2018-09-08 | End: 2018-09-08 | Stop reason: SDUPTHER

## 2018-09-08 RX ORDER — LOSARTAN POTASSIUM 100 MG/1
100 TABLET ORAL DAILY
Status: DISCONTINUED | OUTPATIENT
Start: 2018-09-08 | End: 2018-09-12 | Stop reason: HOSPADM

## 2018-09-08 RX ORDER — METOPROLOL TARTRATE 5 MG/5ML
5 INJECTION INTRAVENOUS EVERY 6 HOURS PRN
Status: DISCONTINUED | OUTPATIENT
Start: 2018-09-08 | End: 2018-09-12 | Stop reason: HOSPADM

## 2018-09-08 RX ORDER — ROCURONIUM BROMIDE 10 MG/ML
INJECTION, SOLUTION INTRAVENOUS PRN
Status: DISCONTINUED | OUTPATIENT
Start: 2018-09-08 | End: 2018-09-08 | Stop reason: SDUPTHER

## 2018-09-08 RX ORDER — PROPOFOL 10 MG/ML
INJECTION, EMULSION INTRAVENOUS PRN
Status: DISCONTINUED | OUTPATIENT
Start: 2018-09-08 | End: 2018-09-08 | Stop reason: SDUPTHER

## 2018-09-08 RX ORDER — ACETAMINOPHEN 325 MG/1
650 TABLET ORAL EVERY 4 HOURS PRN
Status: DISCONTINUED | OUTPATIENT
Start: 2018-09-08 | End: 2018-09-12 | Stop reason: HOSPADM

## 2018-09-08 RX ORDER — SODIUM CHLORIDE, SODIUM LACTATE, POTASSIUM CHLORIDE, CALCIUM CHLORIDE 600; 310; 30; 20 MG/100ML; MG/100ML; MG/100ML; MG/100ML
INJECTION, SOLUTION INTRAVENOUS CONTINUOUS PRN
Status: DISCONTINUED | OUTPATIENT
Start: 2018-09-08 | End: 2018-09-08 | Stop reason: SDUPTHER

## 2018-09-08 RX ORDER — LEVOTHYROXINE SODIUM 0.03 MG/1
25 TABLET ORAL DAILY
Status: DISCONTINUED | OUTPATIENT
Start: 2018-09-08 | End: 2018-09-12 | Stop reason: HOSPADM

## 2018-09-08 RX ORDER — AMLODIPINE BESYLATE 5 MG/1
5 TABLET ORAL DAILY
Status: DISCONTINUED | OUTPATIENT
Start: 2018-09-08 | End: 2018-09-12 | Stop reason: HOSPADM

## 2018-09-08 RX ADMIN — HYDROMORPHONE HYDROCHLORIDE 0.5 MG: 1 INJECTION, SOLUTION INTRAMUSCULAR; INTRAVENOUS; SUBCUTANEOUS at 15:14

## 2018-09-08 RX ADMIN — DICLOFENAC 2 G: 10 GEL TOPICAL at 08:30

## 2018-09-08 RX ADMIN — ROCURONIUM BROMIDE 10 MG: 10 INJECTION INTRAVENOUS at 13:40

## 2018-09-08 RX ADMIN — PROPOFOL 100 MG: 10 INJECTION, EMULSION INTRAVENOUS at 13:10

## 2018-09-08 RX ADMIN — Medication 0.4 MG: at 14:43

## 2018-09-08 RX ADMIN — MORPHINE SULFATE 2 MG: 2 INJECTION, SOLUTION INTRAMUSCULAR; INTRAVENOUS at 08:30

## 2018-09-08 RX ADMIN — FENTANYL CITRATE 100 MCG: 50 INJECTION, SOLUTION INTRAMUSCULAR; INTRAVENOUS at 13:10

## 2018-09-08 RX ADMIN — FENTANYL CITRATE 50 MCG: 50 INJECTION, SOLUTION INTRAMUSCULAR; INTRAVENOUS at 14:48

## 2018-09-08 RX ADMIN — FENTANYL CITRATE 50 MCG: 50 INJECTION, SOLUTION INTRAMUSCULAR; INTRAVENOUS at 13:42

## 2018-09-08 RX ADMIN — METRONIDAZOLE 500 MG: 500 INJECTION, SOLUTION INTRAVENOUS at 08:30

## 2018-09-08 RX ADMIN — SODIUM CHLORIDE, POTASSIUM CHLORIDE, SODIUM LACTATE AND CALCIUM CHLORIDE: 600; 310; 30; 20 INJECTION, SOLUTION INTRAVENOUS at 13:05

## 2018-09-08 RX ADMIN — ROCURONIUM BROMIDE 30 MG: 10 INJECTION INTRAVENOUS at 13:10

## 2018-09-08 RX ADMIN — LOSARTAN POTASSIUM 100 MG: 100 TABLET ORAL at 20:33

## 2018-09-08 RX ADMIN — MORPHINE SULFATE 2 MG: 2 INJECTION, SOLUTION INTRAMUSCULAR; INTRAVENOUS at 00:30

## 2018-09-08 RX ADMIN — HYDROMORPHONE HYDROCHLORIDE 0.5 MG: 1 INJECTION, SOLUTION INTRAMUSCULAR; INTRAVENOUS; SUBCUTANEOUS at 15:33

## 2018-09-08 RX ADMIN — NEOSTIGMINE METHYLSULFATE 3 MG: 1 INJECTION, SOLUTION INTRAVENOUS at 14:43

## 2018-09-08 RX ADMIN — CIPROFLOXACIN 400 MG: 2 INJECTION, SOLUTION INTRAVENOUS at 00:28

## 2018-09-08 RX ADMIN — AMLODIPINE BESYLATE 5 MG: 5 TABLET ORAL at 20:33

## 2018-09-08 RX ADMIN — METRONIDAZOLE 500 MG: 500 INJECTION, SOLUTION INTRAVENOUS at 18:24

## 2018-09-08 RX ADMIN — METRONIDAZOLE 500 MG: 500 INJECTION, SOLUTION INTRAVENOUS at 01:30

## 2018-09-08 RX ADMIN — HYDROMORPHONE HYDROCHLORIDE 0.5 MG: 1 INJECTION, SOLUTION INTRAMUSCULAR; INTRAVENOUS; SUBCUTANEOUS at 15:05

## 2018-09-08 RX ADMIN — KETOROLAC TROMETHAMINE 15 MG: 30 INJECTION, SOLUTION INTRAMUSCULAR; INTRAVENOUS at 15:50

## 2018-09-08 RX ADMIN — CIPROFLOXACIN 400 MG: 2 INJECTION, SOLUTION INTRAVENOUS at 22:40

## 2018-09-08 RX ADMIN — ONDANSETRON 4 MG: 2 INJECTION INTRAMUSCULAR; INTRAVENOUS at 14:26

## 2018-09-08 RX ADMIN — DEXAMETHASONE SODIUM PHOSPHATE 4 MG: 4 INJECTION, SOLUTION INTRAMUSCULAR; INTRAVENOUS at 13:22

## 2018-09-08 RX ADMIN — SODIUM CHLORIDE: 9 INJECTION, SOLUTION INTRAVENOUS at 16:59

## 2018-09-08 ASSESSMENT — PULMONARY FUNCTION TESTS
PIF_VALUE: 20
PIF_VALUE: 3
PIF_VALUE: 1
PIF_VALUE: 21
PIF_VALUE: 20
PIF_VALUE: 21
PIF_VALUE: 20
PIF_VALUE: 19
PIF_VALUE: 19
PIF_VALUE: 3
PIF_VALUE: 19
PIF_VALUE: 21
PIF_VALUE: 2
PIF_VALUE: 21
PIF_VALUE: 21
PIF_VALUE: 19
PIF_VALUE: 20
PIF_VALUE: 21
PIF_VALUE: 20
PIF_VALUE: 22
PIF_VALUE: 20
PIF_VALUE: 22
PIF_VALUE: 18
PIF_VALUE: 20
PIF_VALUE: 20
PIF_VALUE: 18
PIF_VALUE: 21
PIF_VALUE: 18
PIF_VALUE: 21
PIF_VALUE: 19
PIF_VALUE: 18
PIF_VALUE: 18
PIF_VALUE: 19
PIF_VALUE: 18
PIF_VALUE: 19
PIF_VALUE: 2
PIF_VALUE: 2
PIF_VALUE: 22
PIF_VALUE: 21
PIF_VALUE: 3
PIF_VALUE: 19
PIF_VALUE: 18
PIF_VALUE: 20
PIF_VALUE: 18
PIF_VALUE: 20
PIF_VALUE: 18
PIF_VALUE: 20
PIF_VALUE: 21
PIF_VALUE: 21
PIF_VALUE: 18
PIF_VALUE: 19
PIF_VALUE: 20
PIF_VALUE: 19
PIF_VALUE: 19
PIF_VALUE: 21
PIF_VALUE: 1
PIF_VALUE: 37
PIF_VALUE: 20
PIF_VALUE: 21
PIF_VALUE: 21
PIF_VALUE: 20
PIF_VALUE: 21
PIF_VALUE: 18
PIF_VALUE: 18
PIF_VALUE: 19
PIF_VALUE: 20
PIF_VALUE: 2
PIF_VALUE: 20
PIF_VALUE: 21
PIF_VALUE: 22
PIF_VALUE: 21
PIF_VALUE: 18
PIF_VALUE: 21
PIF_VALUE: 19
PIF_VALUE: 20
PIF_VALUE: 18
PIF_VALUE: 19
PIF_VALUE: 20
PIF_VALUE: 19
PIF_VALUE: 21
PIF_VALUE: 22
PIF_VALUE: 21
PIF_VALUE: 22
PIF_VALUE: 20
PIF_VALUE: 20
PIF_VALUE: 19
PIF_VALUE: 2
PIF_VALUE: 19
PIF_VALUE: 19
PIF_VALUE: 20
PIF_VALUE: 20
PIF_VALUE: 19
PIF_VALUE: 20
PIF_VALUE: 19
PIF_VALUE: 18
PIF_VALUE: 20
PIF_VALUE: 19

## 2018-09-08 ASSESSMENT — PAIN DESCRIPTION - DESCRIPTORS
DESCRIPTORS: ACHING

## 2018-09-08 ASSESSMENT — PAIN SCALES - GENERAL
PAINLEVEL_OUTOF10: 9
PAINLEVEL_OUTOF10: 9
PAINLEVEL_OUTOF10: 10
PAINLEVEL_OUTOF10: 9
PAINLEVEL_OUTOF10: 4
PAINLEVEL_OUTOF10: 9
PAINLEVEL_OUTOF10: 3
PAINLEVEL_OUTOF10: 5
PAINLEVEL_OUTOF10: 9

## 2018-09-08 ASSESSMENT — PAIN DESCRIPTION - LOCATION
LOCATION: ABDOMEN

## 2018-09-08 ASSESSMENT — PAIN DESCRIPTION - PAIN TYPE
TYPE: SURGICAL PAIN

## 2018-09-08 NOTE — PROGRESS NOTES
Patient has continued complaints of severe knee pain despite IV morphine given and use of topical cream. RN messaged Dr. Celena Jose via perfect serve to discuss pain management.

## 2018-09-08 NOTE — OP NOTE
OPERATIVE NOTE    DATE OF PROCEDURE: 9/8/2018     Enriqueta Hodges MD    ASSISTANT: none    PREOPERATIVE DIAGNOSIS: acute gallstone pancreatitis    POSTOPERATIVE DIAGNOSIS: Same     OPERATION: open cholecystectomy with intraoperative cholangiogram    ANESTHESIA: General anesthesia    ESTIMATED BLOOD LOSS:  35ZS    COMPLICATIONS: None. SPECIMENS:  Was Obtained: gallbladder     HISTORY: The patient is a 80y.o. year old female with history of above preop diagnosis. I explained the risk, benefits, expected outcome, and alternatives to the procedure. Patient understands and is in agreement. PROCEDURE: The patient was given general anesthesia. The abdomen was prepped and draped in typical sterile fashion. The base of the umbilicus is grasped with a kocher and elevated. A longitudinal incision is made with an #11blade. The base of the umbilicus is grasped with a kocker and 0 Vicryl suture is placed on either side of the fascia. An incision is made in the fascia with a #11blade and the peritoneum is entered bluntly. The  Curran canula was inserted and secured into place with the sutures. Pneumoperitoneum was established with CO2 gas with maximum pressure of 15 mm Hg. The abdomen was full of dense adhesions and we could not proceed laparoscopically    The abdomen was opened two fingerbreadths below the right costal margin with a 10 blade. Dissection is done with electroboviecautery to the fascia. The anterior rectus sheath is incised with cautery and the rectus muscle is lifted up with an [employer] retractor. The rectus muscle is taken down with electroboviecautery. The posterior rectus sheath is taken down and the peritoneum is entered. A bookwalter retractor is placed and two packs are placed above the liver. The gallbladder was grasped at the fundus with a matheus. The gallbladder is taken down in a fundus down approach. The neck of the gallbladder was retracted laterally.   The peritoneum over

## 2018-09-09 PROBLEM — Z90.49 S/P CHOLECYSTECTOMY: Chronic | Status: ACTIVE | Noted: 2018-09-09

## 2018-09-09 LAB
ALBUMIN SERPL-MCNC: 2.7 G/DL (ref 3.5–5.2)
ALBUMIN/GLOBULIN RATIO: ABNORMAL (ref 1–2.5)
ALP BLD-CCNC: 45 U/L (ref 35–104)
ALT SERPL-CCNC: 25 U/L (ref 5–33)
ANION GAP SERPL CALCULATED.3IONS-SCNC: 10 MMOL/L (ref 9–17)
AST SERPL-CCNC: 33 U/L
BILIRUB SERPL-MCNC: 0.21 MG/DL (ref 0.3–1.2)
BUN BLDV-MCNC: 14 MG/DL (ref 8–23)
BUN/CREAT BLD: ABNORMAL (ref 9–20)
CALCIUM IONIZED: 1.15 MMOL/L (ref 1.13–1.33)
CALCIUM SERPL-MCNC: 7.7 MG/DL (ref 8.6–10.4)
CHLORIDE BLD-SCNC: 108 MMOL/L (ref 98–107)
CO2: 22 MMOL/L (ref 20–31)
CREAT SERPL-MCNC: 0.72 MG/DL (ref 0.5–0.9)
GFR AFRICAN AMERICAN: >60 ML/MIN
GFR NON-AFRICAN AMERICAN: >60 ML/MIN
GFR SERPL CREATININE-BSD FRML MDRD: ABNORMAL ML/MIN/{1.73_M2}
GFR SERPL CREATININE-BSD FRML MDRD: ABNORMAL ML/MIN/{1.73_M2}
GLUCOSE BLD-MCNC: 104 MG/DL (ref 70–99)
HCT VFR BLD CALC: 29.2 % (ref 36–46)
HEMOGLOBIN: 9.1 G/DL (ref 12–16)
MCH RBC QN AUTO: 27.3 PG (ref 26–34)
MCHC RBC AUTO-ENTMCNC: 31.3 G/DL (ref 31–37)
MCV RBC AUTO: 87.4 FL (ref 80–100)
NRBC AUTOMATED: ABNORMAL PER 100 WBC
PDW BLD-RTO: 15.9 % (ref 11.5–14.9)
PLATELET # BLD: 246 K/UL (ref 150–450)
PMV BLD AUTO: 7.7 FL (ref 6–12)
POTASSIUM SERPL-SCNC: 4 MMOL/L (ref 3.7–5.3)
RBC # BLD: 3.34 M/UL (ref 4–5.2)
SODIUM BLD-SCNC: 140 MMOL/L (ref 135–144)
TOTAL PROTEIN: 5.8 G/DL (ref 6.4–8.3)
WBC # BLD: 14.3 K/UL (ref 3.5–11)

## 2018-09-09 PROCEDURE — 2580000003 HC RX 258: Performed by: SURGERY

## 2018-09-09 PROCEDURE — 6360000002 HC RX W HCPCS: Performed by: STUDENT IN AN ORGANIZED HEALTH CARE EDUCATION/TRAINING PROGRAM

## 2018-09-09 PROCEDURE — 36415 COLL VENOUS BLD VENIPUNCTURE: CPT

## 2018-09-09 PROCEDURE — 80053 COMPREHEN METABOLIC PANEL: CPT

## 2018-09-09 PROCEDURE — 2580000003 HC RX 258: Performed by: FAMILY MEDICINE

## 2018-09-09 PROCEDURE — 6370000000 HC RX 637 (ALT 250 FOR IP): Performed by: SURGERY

## 2018-09-09 PROCEDURE — 97162 PT EVAL MOD COMPLEX 30 MIN: CPT

## 2018-09-09 PROCEDURE — 94664 DEMO&/EVAL PT USE INHALER: CPT

## 2018-09-09 PROCEDURE — 97110 THERAPEUTIC EXERCISES: CPT

## 2018-09-09 PROCEDURE — 2500000003 HC RX 250 WO HCPCS: Performed by: STUDENT IN AN ORGANIZED HEALTH CARE EDUCATION/TRAINING PROGRAM

## 2018-09-09 PROCEDURE — 2060000000 HC ICU INTERMEDIATE R&B

## 2018-09-09 PROCEDURE — 82330 ASSAY OF CALCIUM: CPT

## 2018-09-09 PROCEDURE — 6360000002 HC RX W HCPCS: Performed by: FAMILY MEDICINE

## 2018-09-09 PROCEDURE — 85027 COMPLETE CBC AUTOMATED: CPT

## 2018-09-09 PROCEDURE — 99232 SBSQ HOSP IP/OBS MODERATE 35: CPT | Performed by: FAMILY MEDICINE

## 2018-09-09 RX ORDER — OXYCODONE HYDROCHLORIDE AND ACETAMINOPHEN 5; 325 MG/1; MG/1
2 TABLET ORAL EVERY 4 HOURS PRN
Status: DISCONTINUED | OUTPATIENT
Start: 2018-09-09 | End: 2018-09-12 | Stop reason: HOSPADM

## 2018-09-09 RX ORDER — MORPHINE SULFATE 4 MG/ML
4 INJECTION, SOLUTION INTRAMUSCULAR; INTRAVENOUS
Status: DISCONTINUED | OUTPATIENT
Start: 2018-09-09 | End: 2018-09-12 | Stop reason: HOSPADM

## 2018-09-09 RX ORDER — OXYCODONE HYDROCHLORIDE AND ACETAMINOPHEN 5; 325 MG/1; MG/1
1 TABLET ORAL EVERY 4 HOURS PRN
Status: DISCONTINUED | OUTPATIENT
Start: 2018-09-09 | End: 2018-09-12 | Stop reason: HOSPADM

## 2018-09-09 RX ORDER — MORPHINE SULFATE 2 MG/ML
2 INJECTION, SOLUTION INTRAMUSCULAR; INTRAVENOUS
Status: DISCONTINUED | OUTPATIENT
Start: 2018-09-09 | End: 2018-09-12 | Stop reason: HOSPADM

## 2018-09-09 RX ADMIN — LEVOTHYROXINE SODIUM 25 MCG: 25 TABLET ORAL at 08:49

## 2018-09-09 RX ADMIN — METRONIDAZOLE 500 MG: 500 INJECTION, SOLUTION INTRAVENOUS at 17:12

## 2018-09-09 RX ADMIN — OXYCODONE HYDROCHLORIDE AND ACETAMINOPHEN 1 TABLET: 5; 325 TABLET ORAL at 05:56

## 2018-09-09 RX ADMIN — METRONIDAZOLE 500 MG: 500 INJECTION, SOLUTION INTRAVENOUS at 00:00

## 2018-09-09 RX ADMIN — DICLOFENAC 2 G: 10 GEL TOPICAL at 10:03

## 2018-09-09 RX ADMIN — LOSARTAN POTASSIUM 100 MG: 100 TABLET ORAL at 08:45

## 2018-09-09 RX ADMIN — CIPROFLOXACIN 400 MG: 2 INJECTION, SOLUTION INTRAVENOUS at 10:46

## 2018-09-09 RX ADMIN — ENOXAPARIN SODIUM 40 MG: 40 INJECTION SUBCUTANEOUS at 08:47

## 2018-09-09 RX ADMIN — DICLOFENAC 2 G: 10 GEL TOPICAL at 13:49

## 2018-09-09 RX ADMIN — Medication 1 MG: at 21:30

## 2018-09-09 RX ADMIN — OXYCODONE HYDROCHLORIDE AND ACETAMINOPHEN 1 TABLET: 5; 325 TABLET ORAL at 15:06

## 2018-09-09 RX ADMIN — SODIUM CHLORIDE: 9 INJECTION, SOLUTION INTRAVENOUS at 16:29

## 2018-09-09 RX ADMIN — OXYCODONE HYDROCHLORIDE AND ACETAMINOPHEN 1 TABLET: 5; 325 TABLET ORAL at 21:30

## 2018-09-09 RX ADMIN — Medication 1 CAPSULE: at 08:45

## 2018-09-09 RX ADMIN — SODIUM CHLORIDE: 9 INJECTION, SOLUTION INTRAVENOUS at 00:01

## 2018-09-09 RX ADMIN — METRONIDAZOLE 500 MG: 500 INJECTION, SOLUTION INTRAVENOUS at 08:45

## 2018-09-09 RX ADMIN — AMLODIPINE BESYLATE 5 MG: 5 TABLET ORAL at 08:45

## 2018-09-09 ASSESSMENT — PAIN SCALES - GENERAL
PAINLEVEL_OUTOF10: 3
PAINLEVEL_OUTOF10: 6
PAINLEVEL_OUTOF10: 2
PAINLEVEL_OUTOF10: 3
PAINLEVEL_OUTOF10: 3
PAINLEVEL_OUTOF10: 5
PAINLEVEL_OUTOF10: 3

## 2018-09-09 ASSESSMENT — PAIN DESCRIPTION - LOCATION
LOCATION: ABDOMEN
LOCATION: ABDOMEN

## 2018-09-09 ASSESSMENT — PAIN DESCRIPTION - PAIN TYPE
TYPE: SURGICAL PAIN
TYPE: SURGICAL PAIN

## 2018-09-09 ASSESSMENT — PAIN DESCRIPTION - ORIENTATION: ORIENTATION: LOWER

## 2018-09-09 NOTE — PROGRESS NOTES
Pain: Yes       Orientation  Orientation  Overall Orientation Status: Within Normal Limits    Social/Functional History  Social/Functional History  Lives With: Alone  Type of Home: Apartment (condo)  Home Layout: One level  Home Access: Stairs to enter with rails  Entrance Stairs - Number of Steps: 2  Receives Help From: Family  ADL Assistance: Independent  Homemaking Assistance: Independent  Ambulation Assistance: Independent  Transfer Assistance: Independent  Objective          AROM RLE (degrees)  RLE AROM: WFL  AROM LLE (degrees)  LLE AROM : Exceptions  L Knee Flexion 0-145: 60  L Knee Extension 0: 5  Strength RLE  Strength RLE: WFL  Comment: 4/5  Strength LLE  Strength LLE: WFL  Comment: 4/5 except knee 3/5     Sensation  Overall Sensation Status: WNL  Bed mobility  Scooting: Minimal assistance  Transfers  Sit to Stand: Minimal Assistance  Stand to sit: Contact guard assistance  Stand Pivot Transfers: Contact guard assistance  Comment: stand pivot bed to recliner; first time up since surgery; pt c/o slight dizziness  Ambulation  Ambulation?: No     Balance  Sitting - Static: Good  Sitting - Dynamic: Fair  Standing - Static: Fair;-  Standing - Dynamic: Fair;-  Exercises  Hip Flexion: 15 (L)  Knee Long Arc Quad: 15 (L)  Ankle Pumps: 20     Assessment   Body structures, Functions, Activity limitations: Decreased functional mobility ; Decreased strength;Decreased endurance;Decreased balance  Treatment Diagnosis: dec mobility  Prognosis: Good  Patient Education: POC, exercises  REQUIRES PT FOLLOW UP: Yes  Activity Tolerance  Activity Tolerance: Patient Tolerated treatment well;Patient limited by fatigue         Plan   Plan  Times per week: 6-7  Times per day: Daily  Current Treatment Recommendations: Strengthening, Balance Training, Functional Mobility Training, Gait Training, Patient/Caregiver Education & Training    G-Code     OutComes Score                                           AM-PAC Score Goals  Short term goals  Time Frame for Short term goals: 3 days  Short term goal 1: MI bed mobility. Short term goal 2: Pt to transfer with (S) without LOB. Short term goal 3: Pt to ambulate > 80' with device as needed with MI-(S) without LOB. Short term goal 4: Improve LE strength and ROM via seated PRE's x 20 reps.        Therapy Time   Individual Concurrent Group Co-treatment   Time In 1440         Time Out 1520         Minutes 40                 Lorraine Badillo

## 2018-09-09 NOTE — PROGRESS NOTES
Elisa has been taken out per Dr. Calvin Flowers orders. Pt expresses concern with toileting here d/t pt having extreme pain in left knee when it bends. Pt reports that at home she has a lift toilet so she doesn't need to steve her knee too much. RN will utilize pain relief cream and continue to encourage ambulation. PT is ordered to work with this patient today.

## 2018-09-09 NOTE — PLAN OF CARE
Problem: Falls - Risk of:  Goal: Will remain free from falls  Will remain free from falls   Outcome: Met This Shift  Safety maintained this shift    Problem: Risk for Impaired Skin Integrity  Goal: Tissue integrity - skin and mucous membranes  Structural intactness and normal physiological function of skin and  mucous membranes.    Outcome: Met This Shift  No new skin breakdown noted    Problem: Pain:  Goal: Pain level will decrease  Pain level will decrease   Outcome: Met This Shift  Pt denies need for pain medication throughout shift, she is resting unless awoken for assessment

## 2018-09-09 NOTE — PLAN OF CARE
Problem: Falls - Risk of:  Goal: Will remain free from falls  Will remain free from falls   Outcome: Ongoing  The patient remained free from falls this shift, call light within reach, bed in locked and lowest position. Side rails up x2. Continue to monitor closely. Problem: Risk for Impaired Skin Integrity  Goal: Tissue integrity - skin and mucous membranes  Structural intactness and normal physiological function of skin and  mucous membranes. Outcome: Ongoing  Skin assessment complete. Pt turned and repositioned per self. Area kept free from moisture. Proper nourishment and fluids encouraged, as appropriate. Skin remains clean, dry, and intact. Will continue to monitor for additional needs and changes in skin breakdown. Problem: Pain:  Goal: Pain level will decrease  Pain level will decrease   Outcome: Ongoing  Pt medicated with pain medication prn. Assessed all pain characteristics including level, type, location, frequency, and onset. Non-pharmacologic interventions offered to pt as well. Pt states pain is tolerable at this time.  Will continue to monitor

## 2018-09-09 NOTE — PROGRESS NOTES
Surgery Progress Note             POD # 1    PATIENT NAME: Raghu Singh     TODAY'S DATE: 9/9/2018, 7:31 AM    Chief complaint: s/p open ludmila    SUBJECTIVE:    Pt is feeling well not out of bed yet. Patients pain is well controlled. Pt is not passing gas. Pt has not had a bowel movement. OBJECTIVE:   VITALS:  BP (!) 137/50   Pulse 80   Temp 97.3 °F (36.3 °C) (Oral)   Resp 18   Ht 5' (1.524 m)   Wt 145 lb (65.8 kg)   SpO2 92%   BMI 28.32 kg/m²     INTAKE/OUTPUT:      Intake/Output Summary (Last 24 hours) at 09/09/18 0731  Last data filed at 09/09/18 0634   Gross per 24 hour   Intake             2858 ml   Output              950 ml   Net             1908 ml                 CONSTITUTIONAL:  awake and alert.   No acute distress  CARDIOVASCULAR:  regular rate and rhythm and No Murmur  LUNGS:  CTA Bilaterally  ABDOMEN:   Abdomen soft, non-tender, non-distended  INCISION: Incision clean/dry/intact    Data:  CBC:   Lab Results   Component Value Date    WBC 14.3 09/09/2018    RBC 3.34 09/09/2018    RBC 3.81 05/02/2012    HGB 9.1 09/09/2018    HCT 29.2 09/09/2018    MCV 87.4 09/09/2018    MCH 27.3 09/09/2018    MCHC 31.3 09/09/2018    RDW 15.9 09/09/2018     09/09/2018     05/02/2012    MPV 7.7 09/09/2018     CMP:    Lab Results   Component Value Date     09/09/2018    K 4.0 09/09/2018     09/09/2018    CO2 22 09/09/2018    BUN 14 09/09/2018    CREATININE 0.72 09/09/2018    GFRAA >60 09/09/2018    LABGLOM >60 09/09/2018    GLUCOSE 104 09/09/2018    GLUCOSE 79 05/02/2012    PROT 5.8 09/09/2018    LABALBU 2.7 09/09/2018    LABALBU 3.8 05/02/2012    CALCIUM 7.7 09/09/2018    BILITOT 0.21 09/09/2018    ALKPHOS 45 09/09/2018    AST 33 09/09/2018    ALT 25 09/09/2018         ASSESSMENT   80 y.o. female Patient Active Hospital Problem List:    Patient Active Problem List   Diagnosis    Hypertension    Hyperlipidemia    Hard of hearing    Anemia    Hypothyroid    GERD (gastroesophageal

## 2018-09-09 NOTE — PROGRESS NOTES
Progress Note    9/9/2018 8:45 AM  Subjective: Interval History: Patient states she really doesn't have much pain in abdomen, but does hurt with coughing and sitting up. Left knee pain is better. Diet: DIET LOW FAT; Medications:   Reviewed medications    Labs:   CBC:   Recent Labs      09/09/18   0540   WBC  14.3*   HGB  9.1*   PLT  246     BMP:    Recent Labs      09/09/18   0540   NA  140   K  4.0   CL  108*   CO2  22   BUN  14   CREATININE  0.72   GLUCOSE  104*     INR: No results for input(s): INR in the last 72 hours. Objective:   Vitals: BP (!) 137/50   Pulse 80   Temp 97.3 °F (36.3 °C) (Oral)   Resp 18   Ht 5' (1.524 m)   Wt 145 lb (65.8 kg)   SpO2 92%   BMI 28.32 kg/m²   General appearance: alert and cooperative with exam  Neck: no adenopathy  Lungs: clear to auscultation bilaterally and diminished in bases  Heart: regular rate and rhythm, S1, S2 normal, no murmur, click, rub or gallop  Abdomen: post op  Extremities: Left knee: OA changes less swelling and warmth  Neurologic: Mental status: Alert, oriented, thought content appropriate    Assessment and Plan:    S/P cholecystectomy   hypocalcemia    P: check ionized calcium level.  Decrease IV rate since she is taking po         Patient Active Problem List:     Hypertension     Hyperlipidemia     Hard of hearing     Anemia     Hypothyroid     GERD (gastroesophageal reflux disease)     Diverticulosis     Urinary incontinence     Osteoarthritis     Leaky heart valve     Small bowel obstruction ([address]     Acute gallstone pancreatitis     S/P cholecystectomy      Electronically signed by Mario Mcgraw MD on 9/9/2018 at 8:45 AM

## 2018-09-10 LAB
ALBUMIN SERPL-MCNC: 2.7 G/DL (ref 3.5–5.2)
ALBUMIN/GLOBULIN RATIO: ABNORMAL (ref 1–2.5)
ALP BLD-CCNC: 49 U/L (ref 35–104)
ALT SERPL-CCNC: 21 U/L (ref 5–33)
ANION GAP SERPL CALCULATED.3IONS-SCNC: 12 MMOL/L (ref 9–17)
AST SERPL-CCNC: 22 U/L
BILIRUB SERPL-MCNC: 0.16 MG/DL (ref 0.3–1.2)
BUN BLDV-MCNC: 12 MG/DL (ref 8–23)
BUN/CREAT BLD: ABNORMAL (ref 9–20)
CALCIUM SERPL-MCNC: 8.2 MG/DL (ref 8.6–10.4)
CHLORIDE BLD-SCNC: 109 MMOL/L (ref 98–107)
CO2: 20 MMOL/L (ref 20–31)
CREAT SERPL-MCNC: 0.71 MG/DL (ref 0.5–0.9)
GFR AFRICAN AMERICAN: >60 ML/MIN
GFR NON-AFRICAN AMERICAN: >60 ML/MIN
GFR SERPL CREATININE-BSD FRML MDRD: ABNORMAL ML/MIN/{1.73_M2}
GFR SERPL CREATININE-BSD FRML MDRD: ABNORMAL ML/MIN/{1.73_M2}
GLUCOSE BLD-MCNC: 116 MG/DL (ref 70–99)
HCT VFR BLD CALC: 27.6 % (ref 36–46)
HEMOGLOBIN: 8.8 G/DL (ref 12–16)
MCH RBC QN AUTO: 27.9 PG (ref 26–34)
MCHC RBC AUTO-ENTMCNC: 31.7 G/DL (ref 31–37)
MCV RBC AUTO: 88 FL (ref 80–100)
NRBC AUTOMATED: ABNORMAL PER 100 WBC
PDW BLD-RTO: 16.3 % (ref 11.5–14.9)
PLATELET # BLD: 263 K/UL (ref 150–450)
PMV BLD AUTO: 7.9 FL (ref 6–12)
POTASSIUM SERPL-SCNC: 3.9 MMOL/L (ref 3.7–5.3)
RBC # BLD: 3.14 M/UL (ref 4–5.2)
SODIUM BLD-SCNC: 141 MMOL/L (ref 135–144)
TOTAL PROTEIN: 6 G/DL (ref 6.4–8.3)
WBC # BLD: 12 K/UL (ref 3.5–11)

## 2018-09-10 PROCEDURE — 2500000003 HC RX 250 WO HCPCS: Performed by: FAMILY MEDICINE

## 2018-09-10 PROCEDURE — 6370000000 HC RX 637 (ALT 250 FOR IP): Performed by: SURGERY

## 2018-09-10 PROCEDURE — 6360000002 HC RX W HCPCS: Performed by: STUDENT IN AN ORGANIZED HEALTH CARE EDUCATION/TRAINING PROGRAM

## 2018-09-10 PROCEDURE — 6360000002 HC RX W HCPCS: Performed by: FAMILY MEDICINE

## 2018-09-10 PROCEDURE — 36415 COLL VENOUS BLD VENIPUNCTURE: CPT

## 2018-09-10 PROCEDURE — 97530 THERAPEUTIC ACTIVITIES: CPT

## 2018-09-10 PROCEDURE — 2580000003 HC RX 258: Performed by: FAMILY MEDICINE

## 2018-09-10 PROCEDURE — 6360000002 HC RX W HCPCS: Performed by: SURGERY

## 2018-09-10 PROCEDURE — 2060000000 HC ICU INTERMEDIATE R&B

## 2018-09-10 PROCEDURE — 2500000003 HC RX 250 WO HCPCS: Performed by: STUDENT IN AN ORGANIZED HEALTH CARE EDUCATION/TRAINING PROGRAM

## 2018-09-10 PROCEDURE — 97110 THERAPEUTIC EXERCISES: CPT

## 2018-09-10 PROCEDURE — 85027 COMPLETE CBC AUTOMATED: CPT

## 2018-09-10 PROCEDURE — 80053 COMPREHEN METABOLIC PANEL: CPT

## 2018-09-10 PROCEDURE — 97116 GAIT TRAINING THERAPY: CPT

## 2018-09-10 PROCEDURE — 99232 SBSQ HOSP IP/OBS MODERATE 35: CPT | Performed by: FAMILY MEDICINE

## 2018-09-10 RX ORDER — DOCUSATE SODIUM 100 MG/1
100 CAPSULE, LIQUID FILLED ORAL DAILY
Status: DISCONTINUED | OUTPATIENT
Start: 2018-09-10 | End: 2018-09-12 | Stop reason: HOSPADM

## 2018-09-10 RX ADMIN — DICLOFENAC 2 G: 10 GEL TOPICAL at 22:14

## 2018-09-10 RX ADMIN — METRONIDAZOLE 500 MG: 500 INJECTION, SOLUTION INTRAVENOUS at 16:50

## 2018-09-10 RX ADMIN — SODIUM CHLORIDE: 9 INJECTION, SOLUTION INTRAVENOUS at 16:50

## 2018-09-10 RX ADMIN — AMLODIPINE BESYLATE 5 MG: 5 TABLET ORAL at 09:09

## 2018-09-10 RX ADMIN — LOSARTAN POTASSIUM 100 MG: 100 TABLET ORAL at 09:10

## 2018-09-10 RX ADMIN — OXYCODONE HYDROCHLORIDE AND ACETAMINOPHEN 1 TABLET: 5; 325 TABLET ORAL at 06:11

## 2018-09-10 RX ADMIN — DOCUSATE SODIUM 100 MG: 100 CAPSULE, LIQUID FILLED ORAL at 11:27

## 2018-09-10 RX ADMIN — METRONIDAZOLE 500 MG: 500 INJECTION, SOLUTION INTRAVENOUS at 09:11

## 2018-09-10 RX ADMIN — CIPROFLOXACIN 400 MG: 2 INJECTION, SOLUTION INTRAVENOUS at 01:38

## 2018-09-10 RX ADMIN — OXYCODONE HYDROCHLORIDE AND ACETAMINOPHEN 1 TABLET: 5; 325 TABLET ORAL at 16:50

## 2018-09-10 RX ADMIN — Medication 1 CAPSULE: at 09:10

## 2018-09-10 RX ADMIN — DICLOFENAC 2 G: 10 GEL TOPICAL at 13:34

## 2018-09-10 RX ADMIN — ENOXAPARIN SODIUM 40 MG: 40 INJECTION SUBCUTANEOUS at 09:10

## 2018-09-10 RX ADMIN — CIPROFLOXACIN 400 MG: 2 INJECTION, SOLUTION INTRAVENOUS at 23:27

## 2018-09-10 RX ADMIN — METOPROLOL TARTRATE 5 MG: 5 INJECTION, SOLUTION INTRAVENOUS at 13:54

## 2018-09-10 RX ADMIN — Medication 1 MG: at 22:13

## 2018-09-10 RX ADMIN — CIPROFLOXACIN 400 MG: 2 INJECTION, SOLUTION INTRAVENOUS at 11:27

## 2018-09-10 RX ADMIN — LEVOTHYROXINE SODIUM 25 MCG: 25 TABLET ORAL at 06:10

## 2018-09-10 RX ADMIN — METRONIDAZOLE 500 MG: 500 INJECTION, SOLUTION INTRAVENOUS at 02:52

## 2018-09-10 RX ADMIN — DICLOFENAC 2 G: 10 GEL TOPICAL at 09:09

## 2018-09-10 RX ADMIN — MORPHINE SULFATE 2 MG: 2 INJECTION, SOLUTION INTRAMUSCULAR; INTRAVENOUS at 18:22

## 2018-09-10 ASSESSMENT — PAIN DESCRIPTION - ORIENTATION: ORIENTATION: LOWER

## 2018-09-10 ASSESSMENT — PAIN DESCRIPTION - PAIN TYPE: TYPE: SURGICAL PAIN;CHRONIC PAIN

## 2018-09-10 ASSESSMENT — PAIN SCALES - GENERAL
PAINLEVEL_OUTOF10: 0
PAINLEVEL_OUTOF10: 7
PAINLEVEL_OUTOF10: 8
PAINLEVEL_OUTOF10: 5
PAINLEVEL_OUTOF10: 7
PAINLEVEL_OUTOF10: 8
PAINLEVEL_OUTOF10: 8

## 2018-09-10 ASSESSMENT — PAIN DESCRIPTION - LOCATION: LOCATION: ABDOMEN;KNEE

## 2018-09-10 NOTE — PROGRESS NOTES
and independence with functional mobility. Treatment Diagnosis: dec mobility  Prognosis: Good  Patient Education: POC  REQUIRES PT FOLLOW UP: Yes  Activity Tolerance  Activity Tolerance: Patient Tolerated treatment well;Patient limited by fatigue;Patient limited by pain; Patient limited by endurance (\"dizziness\")     Goals  Short term goals  Time Frame for Short term goals: 3 days  Short term goal 1: MI bed mobility. Short term goal 2: Pt to transfer with (S) without LOB. Short term goal 3: Pt to ambulate > 80' with device as needed with MI-(S) without LOB. Short term goal 4: Improve LE strength and ROM via seated PRE's x 20 reps.     Plan    Plan  Times per week: 6-7  Times per day: Daily  Current Treatment Recommendations: Strengthening, Balance Training, Functional Mobility Training, Gait Training, Patient/Caregiver Education & Training     Therapy Time   Individual Concurrent Group Co-treatment   Time In [phone]         Time Out [id number]         Minutes [address] [place], [place]

## 2018-09-10 NOTE — CARE COORDINATION
REBA received info that this patient will need to go for some rehabilitation prior to going home. REBA learned that the patient would like to go to [facility]. REBA faxed the referral to that facility and followed up with Esteban Ambriz in admissions via phone. REBA will follow.

## 2018-09-10 NOTE — PROGRESS NOTES
Surgery Progress Note             POD # 2    PATIENT NAME: Hung Lu     TODAY'S DATE: 9/10/2018, 10:59 AM    Chief complaint: s/p open ludmila    SUBJECTIVE:    Pt feeling well today, sitting at bedside. States pain is well controlled. Tolerating PO, however not much appetite. No N/V. No BM     OBJECTIVE:   VITALS:  BP (!) 138/47   Pulse 81   Temp 98.1 °F (36.7 °C) (Oral)   Resp 16   Ht 5' (1.524 m)   Wt 145 lb (65.8 kg)   SpO2 90%   BMI 28.32 kg/m²     INTAKE/OUTPUT:      Intake/Output Summary (Last 24 hours) at 09/10/18 1059  Last data filed at 09/10/18 0816   Gross per 24 hour   Intake          1428.87 ml   Output              430 ml   Net           998.87 ml                 CONSTITUTIONAL:  awake and alert.   No acute distress  CARDIOVASCULAR:  regular rate and rhythm and No Murmur  LUNGS:  CTA Bilaterally  ABDOMEN:   Abdomen soft, non-tender, non-distended  INCISION: Incision clean/dry/intact    Data:  CBC:   Lab Results   Component Value Date    WBC 12.0 09/10/2018    RBC 3.14 09/10/2018    RBC 3.81 05/02/2012    HGB 8.8 09/10/2018    HCT 27.6 09/10/2018    MCV 88.0 09/10/2018    MCH 27.9 09/10/2018    MCHC 31.7 09/10/2018    RDW 16.3 09/10/2018     09/10/2018     05/02/2012    MPV 7.9 09/10/2018     CMP:    Lab Results   Component Value Date     09/10/2018    K 3.9 09/10/2018     09/10/2018    CO2 20 09/10/2018    BUN 12 09/10/2018    CREATININE 0.71 09/10/2018    GFRAA >60 09/10/2018    LABGLOM >60 09/10/2018    GLUCOSE 116 09/10/2018    GLUCOSE 79 05/02/2012    PROT 6.0 09/10/2018    LABALBU 2.7 09/10/2018    LABALBU 3.8 05/02/2012    CALCIUM 8.2 09/10/2018    BILITOT 0.16 09/10/2018    ALKPHOS 49 09/10/2018    AST 22 09/10/2018    ALT 21 09/10/2018         ASSESSMENT   80 y.o. female Patient Active Hospital Problem List:    Patient Active Problem List   Diagnosis    Hypertension    Hyperlipidemia    Hard of hearing    Anemia    Hypothyroid    GERD (gastroesophageal

## 2018-09-11 LAB
ALBUMIN SERPL-MCNC: 2.6 G/DL (ref 3.5–5.2)
ALBUMIN/GLOBULIN RATIO: ABNORMAL (ref 1–2.5)
ALP BLD-CCNC: 50 U/L (ref 35–104)
ALT SERPL-CCNC: 17 U/L (ref 5–33)
ANION GAP SERPL CALCULATED.3IONS-SCNC: 11 MMOL/L (ref 9–17)
AST SERPL-CCNC: 14 U/L
BILIRUB SERPL-MCNC: 0.21 MG/DL (ref 0.3–1.2)
BUN BLDV-MCNC: 8 MG/DL (ref 8–23)
BUN/CREAT BLD: ABNORMAL (ref 9–20)
CALCIUM SERPL-MCNC: 8.4 MG/DL (ref 8.6–10.4)
CHLORIDE BLD-SCNC: 107 MMOL/L (ref 98–107)
CO2: 22 MMOL/L (ref 20–31)
CREAT SERPL-MCNC: 0.57 MG/DL (ref 0.5–0.9)
GFR AFRICAN AMERICAN: >60 ML/MIN
GFR NON-AFRICAN AMERICAN: >60 ML/MIN
GFR SERPL CREATININE-BSD FRML MDRD: ABNORMAL ML/MIN/{1.73_M2}
GFR SERPL CREATININE-BSD FRML MDRD: ABNORMAL ML/MIN/{1.73_M2}
GLUCOSE BLD-MCNC: 102 MG/DL (ref 70–99)
HCT VFR BLD CALC: 28.1 % (ref 36–46)
HEMOGLOBIN: 9.4 G/DL (ref 12–16)
MCH RBC QN AUTO: 28.9 PG (ref 26–34)
MCHC RBC AUTO-ENTMCNC: 33.6 G/DL (ref 31–37)
MCV RBC AUTO: 86.1 FL (ref 80–100)
NRBC AUTOMATED: ABNORMAL PER 100 WBC
PDW BLD-RTO: 16.3 % (ref 11.5–14.9)
PLATELET # BLD: 272 K/UL (ref 150–450)
PMV BLD AUTO: 7.8 FL (ref 6–12)
POTASSIUM SERPL-SCNC: 3.3 MMOL/L (ref 3.7–5.3)
RBC # BLD: 3.26 M/UL (ref 4–5.2)
SODIUM BLD-SCNC: 140 MMOL/L (ref 135–144)
SURGICAL PATHOLOGY REPORT: NORMAL
TOTAL PROTEIN: 5.8 G/DL (ref 6.4–8.3)
WBC # BLD: 8.8 K/UL (ref 3.5–11)

## 2018-09-11 PROCEDURE — 36415 COLL VENOUS BLD VENIPUNCTURE: CPT

## 2018-09-11 PROCEDURE — 85027 COMPLETE CBC AUTOMATED: CPT

## 2018-09-11 PROCEDURE — 97116 GAIT TRAINING THERAPY: CPT

## 2018-09-11 PROCEDURE — 80053 COMPREHEN METABOLIC PANEL: CPT

## 2018-09-11 PROCEDURE — 6370000000 HC RX 637 (ALT 250 FOR IP): Performed by: SURGERY

## 2018-09-11 PROCEDURE — 97530 THERAPEUTIC ACTIVITIES: CPT

## 2018-09-11 PROCEDURE — 6360000002 HC RX W HCPCS: Performed by: STUDENT IN AN ORGANIZED HEALTH CARE EDUCATION/TRAINING PROGRAM

## 2018-09-11 PROCEDURE — 6360000002 HC RX W HCPCS: Performed by: FAMILY MEDICINE

## 2018-09-11 PROCEDURE — 2500000003 HC RX 250 WO HCPCS: Performed by: STUDENT IN AN ORGANIZED HEALTH CARE EDUCATION/TRAINING PROGRAM

## 2018-09-11 PROCEDURE — 2060000000 HC ICU INTERMEDIATE R&B

## 2018-09-11 PROCEDURE — 97110 THERAPEUTIC EXERCISES: CPT

## 2018-09-11 PROCEDURE — 6370000000 HC RX 637 (ALT 250 FOR IP): Performed by: FAMILY MEDICINE

## 2018-09-11 PROCEDURE — 99232 SBSQ HOSP IP/OBS MODERATE 35: CPT | Performed by: FAMILY MEDICINE

## 2018-09-11 RX ORDER — POTASSIUM CHLORIDE 7.45 MG/ML
10 INJECTION INTRAVENOUS PRN
Status: DISCONTINUED | OUTPATIENT
Start: 2018-09-11 | End: 2018-09-12 | Stop reason: HOSPADM

## 2018-09-11 RX ORDER — POTASSIUM CHLORIDE 20 MEQ/1
40 TABLET, EXTENDED RELEASE ORAL PRN
Status: DISCONTINUED | OUTPATIENT
Start: 2018-09-11 | End: 2018-09-12 | Stop reason: HOSPADM

## 2018-09-11 RX ORDER — BISACODYL 10 MG
10 SUPPOSITORY, RECTAL RECTAL DAILY PRN
Status: DISCONTINUED | OUTPATIENT
Start: 2018-09-11 | End: 2018-09-12 | Stop reason: HOSPADM

## 2018-09-11 RX ORDER — POTASSIUM CHLORIDE 20MEQ/15ML
40 LIQUID (ML) ORAL PRN
Status: DISCONTINUED | OUTPATIENT
Start: 2018-09-11 | End: 2018-09-12 | Stop reason: HOSPADM

## 2018-09-11 RX ADMIN — POTASSIUM CHLORIDE 40 MEQ: 40 SOLUTION ORAL at 08:58

## 2018-09-11 RX ADMIN — DOCUSATE SODIUM 100 MG: 100 CAPSULE, LIQUID FILLED ORAL at 08:59

## 2018-09-11 RX ADMIN — OXYCODONE HYDROCHLORIDE AND ACETAMINOPHEN 2 TABLET: 5; 325 TABLET ORAL at 08:59

## 2018-09-11 RX ADMIN — OXYCODONE HYDROCHLORIDE AND ACETAMINOPHEN 1 TABLET: 5; 325 TABLET ORAL at 17:01

## 2018-09-11 RX ADMIN — LEVOTHYROXINE SODIUM 25 MCG: 25 TABLET ORAL at 05:21

## 2018-09-11 RX ADMIN — CIPROFLOXACIN 400 MG: 2 INJECTION, SOLUTION INTRAVENOUS at 12:04

## 2018-09-11 RX ADMIN — OXYCODONE HYDROCHLORIDE AND ACETAMINOPHEN 1 TABLET: 5; 325 TABLET ORAL at 22:16

## 2018-09-11 RX ADMIN — ENOXAPARIN SODIUM 40 MG: 40 INJECTION SUBCUTANEOUS at 08:59

## 2018-09-11 RX ADMIN — METRONIDAZOLE 500 MG: 500 INJECTION, SOLUTION INTRAVENOUS at 08:59

## 2018-09-11 RX ADMIN — Medication 1 MG: at 22:18

## 2018-09-11 RX ADMIN — CIPROFLOXACIN 400 MG: 2 INJECTION, SOLUTION INTRAVENOUS at 23:09

## 2018-09-11 RX ADMIN — METRONIDAZOLE 500 MG: 500 INJECTION, SOLUTION INTRAVENOUS at 16:25

## 2018-09-11 RX ADMIN — BISACODYL 10 MG: 10 SUPPOSITORY RECTAL at 10:24

## 2018-09-11 RX ADMIN — DICLOFENAC 2 G: 10 GEL TOPICAL at 08:54

## 2018-09-11 RX ADMIN — LOSARTAN POTASSIUM 100 MG: 100 TABLET ORAL at 08:59

## 2018-09-11 RX ADMIN — OXYCODONE HYDROCHLORIDE AND ACETAMINOPHEN 1 TABLET: 5; 325 TABLET ORAL at 00:15

## 2018-09-11 RX ADMIN — AMLODIPINE BESYLATE 5 MG: 5 TABLET ORAL at 08:59

## 2018-09-11 RX ADMIN — Medication 1 CAPSULE: at 08:59

## 2018-09-11 RX ADMIN — METRONIDAZOLE 500 MG: 500 INJECTION, SOLUTION INTRAVENOUS at 01:07

## 2018-09-11 ASSESSMENT — PAIN SCALES - GENERAL
PAINLEVEL_OUTOF10: 8
PAINLEVEL_OUTOF10: 6
PAINLEVEL_OUTOF10: 0
PAINLEVEL_OUTOF10: 0
PAINLEVEL_OUTOF10: 3
PAINLEVEL_OUTOF10: 0
PAINLEVEL_OUTOF10: 8
PAINLEVEL_OUTOF10: 4
PAINLEVEL_OUTOF10: 0

## 2018-09-11 ASSESSMENT — PAIN DESCRIPTION - LOCATION
LOCATION: ABDOMEN
LOCATION: ABDOMEN

## 2018-09-11 ASSESSMENT — PAIN DESCRIPTION - PAIN TYPE
TYPE: SURGICAL PAIN
TYPE: SURGICAL PAIN

## 2018-09-11 NOTE — PROGRESS NOTES
soft, non-tender; bowel sounds normal; no masses,  no organomegaly  Extremities: extremities normal, atraumatic, no cyanosis or edema  Neurologic: Mental status: Alert, oriented, thought content appropriate    Assessment and Plan:   1. Post op ludmila- continue post op care- may need ECF for skilled. Will see how she does next 1-2 days. Hopefully bowel function will return and pt will eat better. 2. Anemia- stable  3.  S/p pancreatitis due to gall stone    Patient Active Problem List:     Hypertension     Hyperlipidemia     Hard of hearing     Anemia     Hypothyroid     GERD (gastroesophageal reflux disease)     Diverticulosis     Urinary incontinence     Osteoarthritis     Leaky heart valve     Small bowel obstruction ([facility] [address]     Acute gallstone pancreatitis     S/P cholecystectomy      Electronically signed by Mateo Paz MD on 9/11/2018 at 8:54 AM

## 2018-09-11 NOTE — PROGRESS NOTES
Diverticulosis    Urinary incontinence    Osteoarthritis    Leaky heart valve    Small bowel obstruction (HCC)    Acute gallstone pancreatitis    S/P cholecystectomy           Plan  1. GI prophylaxis proton pump inhibitor per orders, pharmacologic prophylaxis (with any of the following: enoxaparin (Lovenox) 40mg SQ 2h prior to surgery then QD) and intermittent pneumatic compression boots  2. normal diet as tolerated, add ensure  3. Increase activity as tolerated  4.  Ok to D/C to [facility] from surgical standpoint    Electronically signed by Martir Barry DO  on 9/11/2018 at 12:30 PM

## 2018-09-11 NOTE — PROGRESS NOTES
Physical Therapy  Facility/Department: [facility] PROGRESSIVE CARE  Daily Treatment Note  NAME: Efren Hargrove  : 1927  MRN: [id number]    Date of Service: 2018    Discharge Recommendations:  [address], 24 hour supervision or assist        Patient Diagnosis(es): The primary encounter diagnosis was Intestinal obstruction, unspecified cause, unspecified whether partial or complete ([facility] [address] A diagnosis of Other acute pancreatitis, unspecified complication status was also pertinent to this visit. has a past medical history of Anemia; Blood transfusion reaction; Diverticulosis; GERD (gastroesophageal reflux disease); Hard of hearing; Hyperlipidemia; Hypertension; Hypothyroid; Leaky heart valve; Lentigo; Osteoarthritis; Palpitations; Temporal arteritis ([address] Urinary incontinence; Wears glasses; and Wears partial dentures. has a past surgical history that includes Cataract removal (Bilateral); Appendectomy; Upper gastrointestinal endoscopy; Colonoscopy; bladder suspension; Hysterectomy; Carpal tunnel release (Bilateral); hiatal hernia repair; Gastric fundoplication; hernia repair (14); Abdomen surgery (14); Gastrostomy tube placement (14); Mohs surgery (Right, 2016); Upper gastrointestinal endoscopy (2017); pr egd transoral biopsy single/multiple (N/A, 2017); and Cholecystectomy (N/A, 2018). Restrictions  Restrictions/Precautions  Restrictions/Precautions: General Precautions, Fall Risk  Subjective   General  Chart Reviewed: Yes  Family / Caregiver Present: Yes  Subjective  Subjective: Pt is in bed upon arrival. Pt 's aide is in room upon arrival. Pt is agreeable to PT. Pt reports that she would like to look into HHPT. General Comment  Comments: EDU romero's pt for PT. Orientation  Orientation  Overall Orientation Status: Within Normal Limits  Objective   Bed mobility  Supine to Sit: Minimal assistance  Scooting: Minimal assistance  Comment:  Air mattress is inflated this date. Pt requires increase time to complete d/t air mattress. Pt able to complete bed mobility with vc's for technique. Transfers  Sit to Stand: Minimal Assistance  Stand to sit: Contact guard assistance  Comment: Pt completes all transfers with RW. Pt demos a posterior lean upon standing. Pt is able to correct with very Min A x1. Ambulation  Ambulation?: Yes  Ambulation 1  Surface: level tile  Device: Rolling Walker  Assistance: Contact guard assistance  Quality of Gait: Pt demos decrease anne, decrease step length, decrease LLE knee flexion, and slight forward flexed posture. Pt instructed in gait technique with fair carryover. Pt requires one standing rest break d/t fatigue. Distance: 60'x1 and 40'x1  Comments: Pt reports \"dizziness\" during amb however is able to continue after rest break. Balance  Posture: Fair  Sitting - Static: Good  Sitting - Dynamic: -;Good  Standing - Static: Fair;+  Standing - Dynamic: Fair  Comments: standing balance assessed with RW. Exercises  Comments: static standing with BUE support x approx 5 min. No LOB noted. Pt able to complete standing heel raises, hip flexion, and ABD/ADD. Pt request to sit down. Writer educated pt on the benefit of going to a SNF to increase strength, endurance, gait, and independence with funtional mobility. Currently pt lives alone and has an aide that comes to her house x2-3 times a week. Pt's aide said she may be able to come more after D/C. Pt would still not have 24/7 assist at this time. Pt is agreeable to go to a SNF and has no further questions at this time. Assessment   Body structures, Functions, Activity limitations: Decreased functional mobility ; Decreased strength;Decreased endurance;Decreased balance  Assessment: Pt is agreeable to PT this date. Pt is limited by pain and \"dizziness\" this date. Pt reports that she is willing to go to a SNF after d/C for a safe return home.  PT would benefit from additional PT to increase strength, ROM, gait, and independence with functional mobility. Prognosis: Good  Patient Education: POC  REQUIRES PT FOLLOW UP: Yes  Activity Tolerance  Activity Tolerance: Patient Tolerated treatment well;Patient limited by fatigue;Patient limited by pain; Patient limited by endurance     Goals  Short term goals  Time Frame for Short term goals: 3 days  Short term goal 1: MI bed mobility. Short term goal 2: Pt to transfer with (S) without LOB. Short term goal 3: Pt to ambulate > 80' with device as needed with MI-(S) without LOB. Short term goal 4: Improve LE strength and ROM via seated PRE's x 20 reps.     Plan    Plan  Times per week: 6-7  Times per day: Daily  Current Treatment Recommendations: Strengthening, Balance Training, Functional Mobility Training, Gait Training, Patient/Caregiver Education & Training     Therapy Time   Individual Concurrent Group Co-treatment   Time In 1116         Time Out 1158         Minutes [address] [place], [place]

## 2018-09-11 NOTE — CARE COORDINATION
DISCHARGE PLANNING NOTE:    Plan is for this patient to go to SNF [facility]. PT recs SNF. LSW following and a referral was made. No precert is required. Remains on IV cipro and IV flagyl. Will continue to follow along with LSW for any discharge needs.      Electronically signed by Janice Camacho RN on 9/11/2018 at 12:15 PM

## 2018-09-11 NOTE — FLOWSHEET NOTE
09/11/18 1139   Provider Notification   Reason for Communication Review case   Provider Name Dr. Cordelia Iraheta   Provider Notification Physician   Method of Communication Face to face   Response See orders   Notification Time 1139     OK for pt to shower. Discussed poor intake; ordered ensure with meals. No BM yet but has passed gas. Pt has had a suppository.      Electronically signed by Fouzia Hopper RN on 9/11/2018 at 11:39 AM

## 2018-09-12 ENCOUNTER — CARE COORDINATION (OUTPATIENT)
Dept: CASE MANAGEMENT | Age: 83
End: 2018-09-12

## 2018-09-12 VITALS
DIASTOLIC BLOOD PRESSURE: 59 MMHG | WEIGHT: 145 LBS | TEMPERATURE: 97.7 F | RESPIRATION RATE: 16 BRPM | SYSTOLIC BLOOD PRESSURE: 143 MMHG | BODY MASS INDEX: 28.47 KG/M2 | HEART RATE: 73 BPM | HEIGHT: 60 IN | OXYGEN SATURATION: 95 %

## 2018-09-12 PROBLEM — K85.10 ACUTE GALLSTONE PANCREATITIS: Status: RESOLVED | Noted: 2018-09-07 | Resolved: 2018-09-12

## 2018-09-12 PROBLEM — K56.609 SMALL BOWEL OBSTRUCTION (HCC): Status: RESOLVED | Noted: 2018-09-06 | Resolved: 2018-09-12

## 2018-09-12 PROBLEM — Z90.49 S/P CHOLECYSTECTOMY: Chronic | Status: RESOLVED | Noted: 2018-09-09 | Resolved: 2018-09-12

## 2018-09-12 LAB
ALBUMIN SERPL-MCNC: 2.7 G/DL (ref 3.5–5.2)
ALBUMIN/GLOBULIN RATIO: ABNORMAL (ref 1–2.5)
ALP BLD-CCNC: 49 U/L (ref 35–104)
ALT SERPL-CCNC: 13 U/L (ref 5–33)
ANION GAP SERPL CALCULATED.3IONS-SCNC: 9 MMOL/L (ref 9–17)
AST SERPL-CCNC: 11 U/L
BILIRUB SERPL-MCNC: 0.18 MG/DL (ref 0.3–1.2)
BUN BLDV-MCNC: 8 MG/DL (ref 8–23)
BUN/CREAT BLD: ABNORMAL (ref 9–20)
CALCIUM SERPL-MCNC: 8.4 MG/DL (ref 8.6–10.4)
CHLORIDE BLD-SCNC: 108 MMOL/L (ref 98–107)
CO2: 24 MMOL/L (ref 20–31)
CREAT SERPL-MCNC: 0.58 MG/DL (ref 0.5–0.9)
GFR AFRICAN AMERICAN: >60 ML/MIN
GFR NON-AFRICAN AMERICAN: >60 ML/MIN
GFR SERPL CREATININE-BSD FRML MDRD: ABNORMAL ML/MIN/{1.73_M2}
GFR SERPL CREATININE-BSD FRML MDRD: ABNORMAL ML/MIN/{1.73_M2}
GLUCOSE BLD-MCNC: 114 MG/DL (ref 70–99)
HCT VFR BLD CALC: 26.6 % (ref 36–46)
HEMOGLOBIN: 8.7 G/DL (ref 12–16)
MCH RBC QN AUTO: 28.3 PG (ref 26–34)
MCHC RBC AUTO-ENTMCNC: 32.9 G/DL (ref 31–37)
MCV RBC AUTO: 86.3 FL (ref 80–100)
NRBC AUTOMATED: ABNORMAL PER 100 WBC
PDW BLD-RTO: 16 % (ref 11.5–14.9)
PLATELET # BLD: 292 K/UL (ref 150–450)
PMV BLD AUTO: 7.7 FL (ref 6–12)
POTASSIUM SERPL-SCNC: 3.8 MMOL/L (ref 3.7–5.3)
RBC # BLD: 3.08 M/UL (ref 4–5.2)
SODIUM BLD-SCNC: 141 MMOL/L (ref 135–144)
TOTAL PROTEIN: 5.7 G/DL (ref 6.4–8.3)
WBC # BLD: 7.8 K/UL (ref 3.5–11)

## 2018-09-12 PROCEDURE — 6370000000 HC RX 637 (ALT 250 FOR IP): Performed by: SURGERY

## 2018-09-12 PROCEDURE — 85027 COMPLETE CBC AUTOMATED: CPT

## 2018-09-12 PROCEDURE — 80053 COMPREHEN METABOLIC PANEL: CPT

## 2018-09-12 PROCEDURE — 2500000003 HC RX 250 WO HCPCS: Performed by: STUDENT IN AN ORGANIZED HEALTH CARE EDUCATION/TRAINING PROGRAM

## 2018-09-12 PROCEDURE — 97116 GAIT TRAINING THERAPY: CPT

## 2018-09-12 PROCEDURE — 97110 THERAPEUTIC EXERCISES: CPT

## 2018-09-12 PROCEDURE — 36415 COLL VENOUS BLD VENIPUNCTURE: CPT

## 2018-09-12 PROCEDURE — 99239 HOSP IP/OBS DSCHRG MGMT >30: CPT | Performed by: FAMILY MEDICINE

## 2018-09-12 PROCEDURE — 2580000003 HC RX 258: Performed by: FAMILY MEDICINE

## 2018-09-12 PROCEDURE — 6360000002 HC RX W HCPCS: Performed by: FAMILY MEDICINE

## 2018-09-12 RX ORDER — OXYCODONE HYDROCHLORIDE AND ACETAMINOPHEN 5; 325 MG/1; MG/1
1-2 TABLET ORAL EVERY 6 HOURS PRN
Qty: 28 TABLET | Refills: 0 | Status: SHIPPED | OUTPATIENT
Start: 2018-09-12 | End: 2018-09-19

## 2018-09-12 RX ORDER — DOCUSATE SODIUM 100 MG/1
100 CAPSULE, LIQUID FILLED ORAL 2 TIMES DAILY
Qty: 30 CAPSULE | Refills: 2 | Status: ON HOLD | OUTPATIENT
Start: 2018-09-12 | End: 2018-10-03 | Stop reason: HOSPADM

## 2018-09-12 RX ADMIN — Medication 1 CAPSULE: at 08:18

## 2018-09-12 RX ADMIN — METRONIDAZOLE 500 MG: 500 INJECTION, SOLUTION INTRAVENOUS at 01:25

## 2018-09-12 RX ADMIN — METRONIDAZOLE 500 MG: 500 INJECTION, SOLUTION INTRAVENOUS at 08:18

## 2018-09-12 RX ADMIN — SODIUM CHLORIDE: 9 INJECTION, SOLUTION INTRAVENOUS at 04:54

## 2018-09-12 RX ADMIN — OXYCODONE HYDROCHLORIDE AND ACETAMINOPHEN 1 TABLET: 5; 325 TABLET ORAL at 01:46

## 2018-09-12 RX ADMIN — AMLODIPINE BESYLATE 5 MG: 5 TABLET ORAL at 08:18

## 2018-09-12 RX ADMIN — DOCUSATE SODIUM 100 MG: 100 CAPSULE, LIQUID FILLED ORAL at 08:18

## 2018-09-12 RX ADMIN — LOSARTAN POTASSIUM 100 MG: 100 TABLET ORAL at 08:18

## 2018-09-12 RX ADMIN — ENOXAPARIN SODIUM 40 MG: 40 INJECTION SUBCUTANEOUS at 08:18

## 2018-09-12 RX ADMIN — DICLOFENAC 2 G: 10 GEL TOPICAL at 04:48

## 2018-09-12 RX ADMIN — OXYCODONE HYDROCHLORIDE AND ACETAMINOPHEN 2 TABLET: 5; 325 TABLET ORAL at 11:00

## 2018-09-12 RX ADMIN — LEVOTHYROXINE SODIUM 25 MCG: 25 TABLET ORAL at 04:56

## 2018-09-12 ASSESSMENT — PAIN DESCRIPTION - ORIENTATION
ORIENTATION: RIGHT
ORIENTATION: RIGHT

## 2018-09-12 ASSESSMENT — PAIN SCALES - GENERAL
PAINLEVEL_OUTOF10: 9
PAINLEVEL_OUTOF10: 7
PAINLEVEL_OUTOF10: 0
PAINLEVEL_OUTOF10: 6
PAINLEVEL_OUTOF10: 6

## 2018-09-12 ASSESSMENT — PAIN DESCRIPTION - PAIN TYPE
TYPE: SURGICAL PAIN
TYPE: SURGICAL PAIN

## 2018-09-12 ASSESSMENT — PAIN DESCRIPTION - LOCATION
LOCATION: ABDOMEN
LOCATION: ABDOMEN

## 2018-09-12 NOTE — PROGRESS NOTES
Surgery Progress Note             POD # 4    PATIENT NAME: Na Silva     TODAY'S DATE: 9/12/2018, 10:50 AM    Chief complaint: s/p open ludmila    SUBJECTIVE:    Pt is feeling well. .  Patients pain is well controlled. Pt is  passing gas. Pt has had a bowel movement. OBJECTIVE:   VITALS:  BP (!) 170/55   Pulse 82   Temp 98 °F (36.7 °C) (Oral)   Resp 16   Ht 5' (1.524 m)   Wt 145 lb (65.8 kg)   SpO2 93%   BMI 28.32 kg/m²     INTAKE/OUTPUT:      Intake/Output Summary (Last 24 hours) at 09/12/18 1050  Last data filed at 09/11/18 1930   Gross per 24 hour   Intake                0 ml   Output              250 ml   Net             -250 ml                 CONSTITUTIONAL:  awake and alert.   No acute distress  CARDIOVASCULAR:  regular rate and rhythm and No Murmur  LUNGS:  CTA Bilaterally  ABDOMEN:   Abdomen soft, non-tender, non-distended  INCISION: Incision clean/dry/intact    Data:  CBC:   Lab Results   Component Value Date    WBC 7.8 09/12/2018    RBC 3.08 09/12/2018    RBC 3.81 05/02/2012    HGB 8.7 09/12/2018    HCT 26.6 09/12/2018    MCV 86.3 09/12/2018    MCH 28.3 09/12/2018    MCHC 32.9 09/12/2018    RDW 16.0 09/12/2018     09/12/2018     05/02/2012    MPV 7.7 09/12/2018     CMP:    Lab Results   Component Value Date     09/12/2018    K 3.8 09/12/2018     09/12/2018    CO2 24 09/12/2018    BUN 8 09/12/2018    CREATININE 0.58 09/12/2018    GFRAA >60 09/12/2018    LABGLOM >60 09/12/2018    GLUCOSE 114 09/12/2018    GLUCOSE 79 05/02/2012    PROT 5.7 09/12/2018    LABALBU 2.7 09/12/2018    LABALBU 3.8 05/02/2012    CALCIUM 8.4 09/12/2018    BILITOT 0.18 09/12/2018    ALKPHOS 49 09/12/2018    AST 11 09/12/2018    ALT 13 09/12/2018           ASSESSMENT   80 y.o. female Patient Active Hospital Problem List:    Patient Active Problem List   Diagnosis    Hypertension    Hyperlipidemia    Hard of hearing    Anemia    Hypothyroid    GERD (gastroesophageal reflux disease)   

## 2018-09-12 NOTE — PROGRESS NOTES
[facility]    Physical Therapy Progress Note    Date: 18  Patient Name: Chao Sinclair       Room: 4324/5077-62  MRN: [id number]   Account: [de-identified]   : 1927  (80 y.o.)   Gender: female     Discharge Recommendations   Subacute/Skilled Nursing Facility, 24 hour supervision or assist             Restrictions/Precautions: General Precautions, Fall Risk   Past Medical History:  has a past medical history of Anemia; Blood transfusion reaction; Diverticulosis; GERD (gastroesophageal reflux disease); Hard of hearing; Hyperlipidemia; Hypertension; Hypothyroid; Leaky heart valve; Lentigo; Osteoarthritis; Palpitations; Temporal arteritis ([address] Urinary incontinence; Wears glasses; and Wears partial dentures. Past Surgical History:   has a past surgical history that includes Cataract removal (Bilateral); Appendectomy; Upper gastrointestinal endoscopy; Colonoscopy; bladder suspension; Hysterectomy; Carpal tunnel release (Bilateral); hiatal hernia repair; Gastric fundoplication; hernia repair (14); Abdomen surgery (14); Gastrostomy tube placement (14); Mohs surgery (Right, 2016); Upper gastrointestinal endoscopy (2017); pr egd transoral biopsy single/multiple (N/A, 2017); and Cholecystectomy (N/A, 2018). Overall Orientation Status: Within Normal Limits  Restrictions/Precautions  Restrictions/Precautions: General Precautions; Fall Risk    Subjective: Pt states she is going to be going home today; pt reports she has moved in 24 hours and is concerned about going home  Comments: EDU Briggs is in pt's room and okay with treatment. Pt is agreeable to PT. Vital Signs  Patient Currently in Pain: Yes  Pain Assessment: 0-10  Pain Level: 6                Bed Mobility:   Bed Mobility  Rolling: Stand by assistance  Supine to Sit: Minimal assistance  Sit to Supine: Unable to assess (left pt in bedside chair)  Scooting: Minimal assistance (to EOB)  Comment:  Air mattress deflated  Bed mobility  Scooting: Minimal assistance (to EOB)    Transfers:  Sit to Stand: Minimal Assistance  Stand to sit: Contact guard assistance  Bed to Chair: Contact guard assistance  Stand Pivot Transfers: Contact guard assistance           Ambulation 1  Surface: level tile  Device: Rolling Walker  Assistance: Contact guard assistance  Quality of Gait: Short step length, minimal knee flexion, minimal dorsiflexion, slightly forward flexed posture with head and gaze downward, steady, no LOB, guarded movements  Distance: 80ft  Comments: vc's for increased heel/toe gait, upright posture with head and gaze scanning environment, vc's to stay inside RW        Stairs/Curb  Stairs?: No                                                     Posture: Fair  Sitting - Static: Good (Seated EOB)  Sitting - Dynamic: Good;- (Seated EOB)  Standing - Static: Good;- (Standing with RW)  Standing - Dynamic: Fair;+ (Standing with RW)     Other exercises?: Yes  Other exercises 1: Bed Mobility x2  Other exercises 2: Dangle EOB 8 min  Other exercises 3: Seated bilat LE therex, x5-7  Other exercises 4: Education on home safety, safe use of RW, safety in bathroom, car transfers           Activity Tolerance: Patient Tolerated treatment well;Patient limited by fatigue;Patient limited by pain; Patient limited by endurance          Assessment  Activity Tolerance: Patient Tolerated treatment well;Patient limited by fatigue;Patient limited by pain; Patient limited by endurance   Body structures, Functions, Activity limitations: Decreased functional mobility ; Decreased strength;Decreased endurance;Decreased balance  Prognosis: Good  Discharge Recommendations: Subacute/Skilled Nursing Facility;24 hour supervision or assist           Plan  Times per week: 6-7  Times per day: Daily  Current Treatment Recommendations: Strengthening, Balance Training, Functional Mobility Training, Gait Training, Patient/Caregiver Education & Training    Patient Education  New Education Provided: POC  Learner:patient  Method: demonstration and explanation       Outcome: needs reinforcement     Goals  Short term goals  Time Frame for Short term goals: 3 days  Short term goal 1: MI bed mobility. Short term goal 2: Pt to transfer with (S) without LOB. Short term goal 3: Pt to ambulate > 80' with device as needed with MI-(S) without LOB. Short term goal 4: Improve LE strength and ROM via seated PRE's x 20 reps.     PT Individual Minutes  Time In: 6940  Time Out: [address]  Minutes: 30    Electronically signed by Dayron Velázquez PTA on 9/12/18 at 2:32 PM

## 2018-09-12 NOTE — PROGRESS NOTES
no masses,  no organomegaly  Extremities: extremities normal, atraumatic, no cyanosis or edema  Neurologic: Mental status: Alert, oriented, thought content appropriate    Assessment and Plan:   1. Post op open ludmila for cholelithiasis/ cholecystitis-   2. S/p pancreatitis  3. S/p SBO    To [address] [place] [address] for rehab today if approved by insurance.      Patient Active Problem List:     Hypertension     Hyperlipidemia     Hard of hearing     Anemia     Hypothyroid     GERD (gastroesophageal reflux disease)     Diverticulosis     Urinary incontinence     Osteoarthritis     Leaky heart valve     S/P cholecystectomy      Electronically signed by Eugenio Pina MD on 9/12/2018 at 11:36 AM

## 2018-09-12 NOTE — PLAN OF CARE
Problem: Risk for Impaired Skin Integrity  Goal: Tissue integrity - skin and mucous membranes  Structural intactness and normal physiological function of skin and  mucous membranes. Outcome: Ongoing  Waffle mattress continues. Patient with pure wick catheter. Turned and repositioned frequently. Skin remains intact.

## 2018-09-12 NOTE — PROGRESS NOTES
Report called to Davis Cortes at [facility]. All questions answered, awaiting patient's son to arrive to take her to [facility].

## 2018-09-13 ENCOUNTER — CARE COORDINATION (OUTPATIENT)
Dept: CASE MANAGEMENT | Age: 83
End: 2018-09-13

## 2018-09-13 NOTE — DISCHARGE SUMMARY
Surgery Discharge Summary     Patient Identification  Flako Huff is a 80 y.o. female. :  1927  Admit Date:  2018    Discharge date:   2018  2:44 PM                                   Disposition: SNF    Discharge Diagnoses:   Patient Active Problem List   Diagnosis    Hypertension    Hyperlipidemia    Hard of hearing    Anemia    Hypothyroid    GERD (gastroesophageal reflux disease)    Diverticulosis    Urinary incontinence    Osteoarthritis    Leaky heart valve    S/P cholecystectomy         Consults: general surgery, GI    Surgery: open ludmila with IOC    Patient Instructions: Activity: no heavy lifting, pushing, pulling for 6 weeks, no driving for 2 weeks or while on analgesics  Diet: As tolerated  Follow-up with 1 in 2 weeks. See pre-printed instructions in chart and given to patient upon discharge. Discharge Medications:      Mary Lou Fruit   [place] Medication Instructions UXF:[id number]    Printed on:18 0848   Medication Information                      acetaminophen 650 MG TABS  Take 650 mg by mouth every 4 hours as needed. amLODIPine (NORVASC) 5 MG tablet  Take 1 tablet by mouth daily             Cranberry-Vitamin C-Inulin (UTI-STAT) LIQD  Take by mouth             diclofenac sodium 1 % GEL  Apply 2 g topically 2 times daily             docusate sodium (COLACE) 100 MG capsule  Take 1 capsule by mouth 2 times daily             ferrous sulfate (FE TABS) 325 (65 Fe) MG EC tablet  Take 1 tablet by mouth 2 times daily             Lactobacillus (PROBIOTIC ACIDOPHILUS) TABS  Take 1 tablet by mouth daily             levothyroxine (SYNTHROID) 25 MCG tablet  Take 1 tablet by mouth Daily             losartan (COZAAR) 100 MG tablet  Take 1 tablet by mouth daily             melatonin 3 MG TABS tablet  Take 3 mg by mouth nightly.              Multiple Vitamins-Minerals (MULTI COMPLETE PO)  Take 1 tablet by mouth daily             multivitamin (OCUVITE) TABS per

## 2018-09-28 ENCOUNTER — HOSPITAL ENCOUNTER (OUTPATIENT)
Age: 83
Setting detail: SPECIMEN
Discharge: HOME | DRG: 372 | End: 2018-09-28
Payer: MEDICARE

## 2018-09-28 LAB
C DIFFICILE TOXINS, PCR: ABNORMAL
SPECIMEN DESCRIPTION: ABNORMAL

## 2018-09-28 PROCEDURE — 87493 C DIFF AMPLIFIED PROBE: CPT

## 2018-09-29 ENCOUNTER — HOSPITAL ENCOUNTER (INPATIENT)
Age: 83
LOS: 3 days | Discharge: NURSING, REHAB OR LONG-TERM CARE | DRG: 372 | End: 2018-10-03
Attending: EMERGENCY MEDICINE | Admitting: FAMILY MEDICINE
Payer: MEDICARE

## 2018-09-29 ENCOUNTER — APPOINTMENT (OUTPATIENT)
Dept: CT IMAGING | Age: 83
DRG: 372 | End: 2018-09-29
Payer: MEDICARE

## 2018-09-29 DIAGNOSIS — E87.6 HYPOKALEMIA: ICD-10-CM

## 2018-09-29 DIAGNOSIS — E86.0 DEHYDRATION: ICD-10-CM

## 2018-09-29 DIAGNOSIS — A04.72 CLOSTRIDIUM DIFFICILE COLITIS: Primary | ICD-10-CM

## 2018-09-29 LAB
ABSOLUTE EOS #: 0.23 K/UL (ref 0–0.4)
ABSOLUTE IMMATURE GRANULOCYTE: ABNORMAL K/UL (ref 0–0.3)
ABSOLUTE LYMPH #: 1.82 K/UL (ref 1–4.8)
ABSOLUTE MONO #: 1.14 K/UL (ref 0.1–1.3)
ALBUMIN SERPL-MCNC: 2.7 G/DL (ref 3.5–5.2)
ALBUMIN/GLOBULIN RATIO: ABNORMAL (ref 1–2.5)
ALP BLD-CCNC: 97 U/L (ref 35–104)
ALT SERPL-CCNC: 9 U/L (ref 5–33)
ANION GAP SERPL CALCULATED.3IONS-SCNC: 14 MMOL/L (ref 9–17)
AST SERPL-CCNC: 9 U/L
BASOPHILS # BLD: 0 % (ref 0–2)
BASOPHILS ABSOLUTE: 0 K/UL (ref 0–0.2)
BILIRUB SERPL-MCNC: 0.33 MG/DL (ref 0.3–1.2)
BILIRUBIN DIRECT: 0.16 MG/DL
BILIRUBIN, INDIRECT: 0.17 MG/DL (ref 0–1)
BUN BLDV-MCNC: 29 MG/DL (ref 8–23)
BUN/CREAT BLD: ABNORMAL (ref 9–20)
CALCIUM SERPL-MCNC: 8.4 MG/DL (ref 8.6–10.4)
CHLORIDE BLD-SCNC: 100 MMOL/L (ref 98–107)
CO2: 23 MMOL/L (ref 20–31)
CREAT SERPL-MCNC: 0.77 MG/DL (ref 0.5–0.9)
DIFFERENTIAL TYPE: ABNORMAL
EKG ATRIAL RATE: 84 BPM
EKG P AXIS: 21 DEGREES
EKG P-R INTERVAL: 142 MS
EKG Q-T INTERVAL: 414 MS
EKG QRS DURATION: 120 MS
EKG QTC CALCULATION (BAZETT): 489 MS
EKG R AXIS: 11 DEGREES
EKG T AXIS: -15 DEGREES
EKG VENTRICULAR RATE: 84 BPM
EOSINOPHILS RELATIVE PERCENT: 1 % (ref 0–4)
GFR AFRICAN AMERICAN: >60 ML/MIN
GFR NON-AFRICAN AMERICAN: >60 ML/MIN
GFR SERPL CREATININE-BSD FRML MDRD: ABNORMAL ML/MIN/{1.73_M2}
GFR SERPL CREATININE-BSD FRML MDRD: ABNORMAL ML/MIN/{1.73_M2}
GLOBULIN: ABNORMAL G/DL (ref 1.5–3.8)
GLUCOSE BLD-MCNC: 113 MG/DL (ref 70–99)
HCT VFR BLD CALC: 31.4 % (ref 36–46)
HEMOGLOBIN: 9.8 G/DL (ref 12–16)
IMMATURE GRANULOCYTES: ABNORMAL %
LACTIC ACID: 0.8 MMOL/L (ref 0.5–2.2)
LIPASE: 11 U/L (ref 13–60)
LYMPHOCYTES # BLD: 8 % (ref 24–44)
MCH RBC QN AUTO: 26.4 PG (ref 26–34)
MCHC RBC AUTO-ENTMCNC: 31.4 G/DL (ref 31–37)
MCV RBC AUTO: 84 FL (ref 80–100)
MONOCYTES # BLD: 5 % (ref 1–7)
MORPHOLOGY: ABNORMAL
NRBC AUTOMATED: ABNORMAL PER 100 WBC
PDW BLD-RTO: 16.8 % (ref 11.5–14.9)
PLATELET # BLD: 403 K/UL (ref 150–450)
PLATELET ESTIMATE: ABNORMAL
PMV BLD AUTO: 7.9 FL (ref 6–12)
POTASSIUM SERPL-SCNC: 3.5 MMOL/L (ref 3.7–5.3)
RBC # BLD: 3.73 M/UL (ref 4–5.2)
RBC # BLD: ABNORMAL 10*6/UL
SEG NEUTROPHILS: 86 % (ref 36–66)
SEGMENTED NEUTROPHILS ABSOLUTE COUNT: 19.51 K/UL (ref 1.3–9.1)
SODIUM BLD-SCNC: 137 MMOL/L (ref 135–144)
TOTAL PROTEIN: 5.9 G/DL (ref 6.4–8.3)
TROPONIN INTERP: NORMAL
TROPONIN T: <0.03 NG/ML
WBC # BLD: 22.7 K/UL (ref 3.5–11)
WBC # BLD: ABNORMAL 10*3/UL

## 2018-09-29 PROCEDURE — 74176 CT ABD & PELVIS W/O CONTRAST: CPT

## 2018-09-29 PROCEDURE — 99285 EMERGENCY DEPT VISIT HI MDM: CPT

## 2018-09-29 PROCEDURE — 2580000003 HC RX 258: Performed by: FAMILY MEDICINE

## 2018-09-29 PROCEDURE — 6370000000 HC RX 637 (ALT 250 FOR IP): Performed by: FAMILY MEDICINE

## 2018-09-29 PROCEDURE — 36415 COLL VENOUS BLD VENIPUNCTURE: CPT

## 2018-09-29 PROCEDURE — 80048 BASIC METABOLIC PNL TOTAL CA: CPT

## 2018-09-29 PROCEDURE — 83605 ASSAY OF LACTIC ACID: CPT

## 2018-09-29 PROCEDURE — G0378 HOSPITAL OBSERVATION PER HR: HCPCS

## 2018-09-29 PROCEDURE — 93005 ELECTROCARDIOGRAM TRACING: CPT

## 2018-09-29 PROCEDURE — 83690 ASSAY OF LIPASE: CPT

## 2018-09-29 PROCEDURE — 84484 ASSAY OF TROPONIN QUANT: CPT

## 2018-09-29 PROCEDURE — 2580000003 HC RX 258: Performed by: EMERGENCY MEDICINE

## 2018-09-29 PROCEDURE — 87040 BLOOD CULTURE FOR BACTERIA: CPT

## 2018-09-29 PROCEDURE — 85025 COMPLETE CBC W/AUTO DIFF WBC: CPT

## 2018-09-29 PROCEDURE — 96374 THER/PROPH/DIAG INJ IV PUSH: CPT

## 2018-09-29 PROCEDURE — 6360000002 HC RX W HCPCS: Performed by: EMERGENCY MEDICINE

## 2018-09-29 PROCEDURE — 80076 HEPATIC FUNCTION PANEL: CPT

## 2018-09-29 RX ORDER — SODIUM CHLORIDE 0.9 % (FLUSH) 0.9 %
10 SYRINGE (ML) INJECTION PRN
Status: DISCONTINUED | OUTPATIENT
Start: 2018-09-29 | End: 2018-10-03 | Stop reason: HOSPADM

## 2018-09-29 RX ORDER — ACETAMINOPHEN 325 MG/1
650 TABLET ORAL EVERY 4 HOURS PRN
Status: DISCONTINUED | OUTPATIENT
Start: 2018-09-29 | End: 2018-10-03 | Stop reason: HOSPADM

## 2018-09-29 RX ORDER — SODIUM CHLORIDE 0.9 % (FLUSH) 0.9 %
10 SYRINGE (ML) INJECTION EVERY 12 HOURS SCHEDULED
Status: DISCONTINUED | OUTPATIENT
Start: 2018-09-29 | End: 2018-10-03 | Stop reason: HOSPADM

## 2018-09-29 RX ORDER — ONDANSETRON 2 MG/ML
4 INJECTION INTRAMUSCULAR; INTRAVENOUS ONCE
Status: COMPLETED | OUTPATIENT
Start: 2018-09-29 | End: 2018-09-29

## 2018-09-29 RX ORDER — 0.9 % SODIUM CHLORIDE 0.9 %
1000 INTRAVENOUS SOLUTION INTRAVENOUS ONCE
Status: COMPLETED | OUTPATIENT
Start: 2018-09-29 | End: 2018-09-29

## 2018-09-29 RX ADMIN — Medication 125 MG: at 22:13

## 2018-09-29 RX ADMIN — ONDANSETRON HYDROCHLORIDE 4 MG: 2 SOLUTION INTRAMUSCULAR; INTRAVENOUS at 17:30

## 2018-09-29 RX ADMIN — SODIUM CHLORIDE 1000 ML: 9 INJECTION, SOLUTION INTRAVENOUS at 17:30

## 2018-09-29 RX ADMIN — DEXTROSE AND SODIUM CHLORIDE 50 ML: 5; 900 INJECTION, SOLUTION INTRAVENOUS at 22:15

## 2018-09-29 ASSESSMENT — ENCOUNTER SYMPTOMS
EYE PAIN: 0
DIARRHEA: 1
RHINORRHEA: 0
COUGH: 0
ABDOMINAL PAIN: 0
NAUSEA: 1
VOMITING: 1
SORE THROAT: 0
SHORTNESS OF BREATH: 0
COLOR CHANGE: 0

## 2018-09-29 ASSESSMENT — PAIN SCALES - GENERAL: PAINLEVEL_OUTOF10: 0

## 2018-09-29 NOTE — ED PROVIDER NOTES
placement (7/2/14); Mohs surgery (Right, 11/28/2016); Upper gastrointestinal endoscopy (07/06/2017); pr egd transoral biopsy single/multiple (N/A, 7/6/2017); and Cholecystectomy (N/A, 9/8/2018). Social History     Social History    Marital status:      Spouse name: N/A    Number of children: 3    Years of education: high school graduation     Occupational History    homemaker      Social History Main Topics    Smoking status: Never Smoker    Smokeless tobacco: Never Used    Alcohol use No      Comment: occasional    Drug use: No    Sexual activity: Not on file     Other Topics Concern    Not on file     Social History Narrative    Lives independently and does her own bathing and driving and cooking       Family History   Problem Relation Age of Onset    Other Father         kidney dz, bright`s dz    Hypertension Brother         2 brothers. heart dz    Heart Disease Other     Arthritis Mother        Allergies:  Ace inhibitors and Sporanox [itraconazole]    Home Medications:  Prior to Admission medications    Medication Sig Start Date End Date Taking?  Authorizing Provider   vancomycin (VANCOCIN HCL) 125 MG capsule Take 1 capsule by mouth 4 times daily for 10 days 9/29/18 10/9/18  Kraig Mejia PA-C   docusate sodium (COLACE) 100 MG capsule Take 1 capsule by mouth 2 times daily 9/12/18   Malena Beach MD   diclofenac sodium 1 % GEL Apply 2 g topically 2 times daily 8/23/18   Anita Maurer MD   Multiple Vitamins-Minerals (MULTI COMPLETE PO) Take 1 tablet by mouth daily    Historical Provider, MD   amLODIPine (NORVASC) 5 MG tablet Take 1 tablet by mouth daily 6/11/18   Anita Maurer MD   ferrous sulfate (FE TABS) 325 (65 Fe) MG EC tablet Take 1 tablet by mouth 2 times daily 6/11/18   Anita Maurer MD   Lactobacillus (PROBIOTIC ACIDOPHILUS) TABS Take 1 tablet by mouth daily 5/21/18   Anita Maurer MD   Cranberry-Vitamin C-Inulin (UTI-STAT) LIQD Take by mouth Historical Provider, MD   levothyroxine (SYNTHROID) 25 MCG tablet Take 1 tablet by mouth Daily 10/25/17   Nisa Ruiz MD   losartan (COZAAR) 100 MG tablet Take 1 tablet by mouth daily 10/25/17   Nisa Ruiz MD   acetaminophen 650 MG TABS Take 650 mg by mouth every 4 hours as needed. 7/9/14   Nisa Ruiz MD   melatonin 3 MG TABS tablet Take 3 mg by mouth nightly. Historical Provider, MD   multivitamin (OCUVITE) TABS per tablet Take 1 tablet by mouth daily. Historical Provider, MD       REVIEW OF SYSTEMS    (2-9 systems for level 4, 10 or more for level 5)      Review of Systems   Constitutional: Negative for chills and fever. HENT: Negative for rhinorrhea and sore throat. Eyes: Negative for pain and visual disturbance. Respiratory: Negative for cough and shortness of breath. Cardiovascular: Negative for chest pain and palpitations. Gastrointestinal: Positive for diarrhea, nausea and vomiting. Negative for abdominal pain. Genitourinary: Negative for difficulty urinating, dysuria, frequency, hematuria, vaginal bleeding and vaginal discharge. Musculoskeletal: Negative for arthralgias and myalgias. Skin: Negative for color change and wound. Neurological: Positive for weakness. Negative for numbness and headaches. Psychiatric/Behavioral: Negative for behavioral problems and dysphoric mood. PHYSICAL EXAM   (up to 7 for level 4, 8 or more for level 5)      INITIAL VITALS:   /61   Pulse 76   Temp 97.6 °F (36.4 °C) (Oral)   Resp 12   Ht 5' (1.524 m)   Wt 145 lb (65.8 kg)   SpO2 97%   BMI 28.32 kg/m²     Physical Exam   Constitutional: She is oriented to person, place, and time. She appears well-developed and well-nourished. No distress. HENT:   Head: Normocephalic and atraumatic. Mouth/Throat: Oropharynx is clear and moist.   Eyes: Pupils are equal, round, and reactive to light. EOM are normal.   Neck: Normal range of motion.    Cardiovascular: Normal rate and regular rhythm. Pulmonary/Chest: Effort normal. No respiratory distress. She has no wheezes. She has no rales. Abdominal: Soft. She exhibits no distension. There is tenderness. There is no rebound and no guarding. Mild tenderness to palpation in the right lower quadrant of the abdomen; no guarding or rigidity; non-peritoneal   Musculoskeletal: Normal range of motion. Neurological: She is alert and oriented to person, place, and time. She has normal strength. GCS eye subscore is 4. GCS verbal subscore is 5. GCS motor subscore is 6. Skin: Skin is warm and dry.    Psychiatric: Her behavior is normal.       DIFFERENTIAL  DIAGNOSIS     PLAN (LABS / Shelley Flack / EKG):  Orders Placed This Encounter   Procedures    Culture Blood #1    Culture Blood #1    CT ABDOMEN PELVIS WO CONTRAST    CBC Auto Differential    Basic Metabolic Panel    Hepatic Function Panel    Lipase    Lactic Acid    TROPONIN    Urinalysis Reflex to Culture    Inpatient consult to Internal Medicine    EKG 12 Lead    Insert peripheral IV    PATIENT STATUS (FROM ED OR OR/PROCEDURAL) Observation       MEDICATIONS ORDERED:  Orders Placed This Encounter   Medications    ondansetron (ZOFRAN) injection 4 mg    0.9 % sodium chloride bolus    vancomycin (FIRVANQ) 50 MG/ML oral solution 125 mg       DIAGNOSTIC RESULTS / EMERGENCY DEPARTMENT COURSE / MDM     LABS:  Results for orders placed or performed during the hospital encounter of 09/29/18   CBC Auto Differential   Result Value Ref Range    WBC 22.7 (H) 3.5 - 11.0 k/uL    RBC 3.73 (L) 4.0 - 5.2 m/uL    Hemoglobin 9.8 (L) 12.0 - 16.0 g/dL    Hematocrit 31.4 (L) 36 - 46 %    MCV 84.0 80 - 100 fL    MCH 26.4 26 - 34 pg    MCHC 31.4 31 - 37 g/dL    RDW 16.8 (H) 11.5 - 14.9 %    Platelets 795 389 - 046 k/uL    MPV 7.9 6.0 - 12.0 fL    NRBC Automated NOT REPORTED per 100 WBC    Differential Type NOT REPORTED     Immature Granulocytes NOT REPORTED 0 %    Absolute Immature

## 2018-09-30 PROBLEM — K52.9 COLITIS PRESUMED INFECTIOUS: Status: ACTIVE | Noted: 2018-09-30

## 2018-09-30 PROBLEM — E86.0 DEHYDRATION: Status: ACTIVE | Noted: 2018-09-30

## 2018-09-30 PROBLEM — E87.6 HYPOKALEMIA: Status: ACTIVE | Noted: 2018-09-30

## 2018-09-30 LAB
ANION GAP SERPL CALCULATED.3IONS-SCNC: 12 MMOL/L (ref 9–17)
BUN BLDV-MCNC: 20 MG/DL (ref 8–23)
BUN/CREAT BLD: ABNORMAL (ref 9–20)
CALCIUM SERPL-MCNC: 8.3 MG/DL (ref 8.6–10.4)
CHLORIDE BLD-SCNC: 105 MMOL/L (ref 98–107)
CO2: 24 MMOL/L (ref 20–31)
CREAT SERPL-MCNC: 0.61 MG/DL (ref 0.5–0.9)
GFR AFRICAN AMERICAN: >60 ML/MIN
GFR NON-AFRICAN AMERICAN: >60 ML/MIN
GFR SERPL CREATININE-BSD FRML MDRD: ABNORMAL ML/MIN/{1.73_M2}
GFR SERPL CREATININE-BSD FRML MDRD: ABNORMAL ML/MIN/{1.73_M2}
GLUCOSE BLD-MCNC: 104 MG/DL (ref 70–99)
HCT VFR BLD CALC: 29.3 % (ref 36–46)
HEMOGLOBIN: 9.4 G/DL (ref 12–16)
MCH RBC QN AUTO: 27.1 PG (ref 26–34)
MCHC RBC AUTO-ENTMCNC: 32 G/DL (ref 31–37)
MCV RBC AUTO: 84.7 FL (ref 80–100)
NRBC AUTOMATED: ABNORMAL PER 100 WBC
PDW BLD-RTO: 16.5 % (ref 11.5–14.9)
PLATELET # BLD: 346 K/UL (ref 150–450)
PMV BLD AUTO: 8.3 FL (ref 6–12)
POTASSIUM SERPL-SCNC: 3.5 MMOL/L (ref 3.7–5.3)
RBC # BLD: 3.46 M/UL (ref 4–5.2)
SODIUM BLD-SCNC: 141 MMOL/L (ref 135–144)
WBC # BLD: 16.1 K/UL (ref 3.5–11)

## 2018-09-30 PROCEDURE — 2580000003 HC RX 258: Performed by: FAMILY MEDICINE

## 2018-09-30 PROCEDURE — 80048 BASIC METABOLIC PNL TOTAL CA: CPT

## 2018-09-30 PROCEDURE — 85027 COMPLETE CBC AUTOMATED: CPT

## 2018-09-30 PROCEDURE — 6370000000 HC RX 637 (ALT 250 FOR IP): Performed by: FAMILY MEDICINE

## 2018-09-30 PROCEDURE — 99223 1ST HOSP IP/OBS HIGH 75: CPT | Performed by: FAMILY MEDICINE

## 2018-09-30 PROCEDURE — 36415 COLL VENOUS BLD VENIPUNCTURE: CPT

## 2018-09-30 PROCEDURE — 1200000000 HC SEMI PRIVATE

## 2018-09-30 RX ORDER — POTASSIUM CHLORIDE 20MEQ/15ML
40 LIQUID (ML) ORAL PRN
Status: DISCONTINUED | OUTPATIENT
Start: 2018-09-30 | End: 2018-10-03 | Stop reason: HOSPADM

## 2018-09-30 RX ORDER — AMLODIPINE BESYLATE 5 MG/1
5 TABLET ORAL DAILY
Status: DISCONTINUED | OUTPATIENT
Start: 2018-09-30 | End: 2018-10-03 | Stop reason: HOSPADM

## 2018-09-30 RX ORDER — LEVOTHYROXINE SODIUM 0.03 MG/1
25 TABLET ORAL DAILY
Status: DISCONTINUED | OUTPATIENT
Start: 2018-09-30 | End: 2018-10-03 | Stop reason: HOSPADM

## 2018-09-30 RX ORDER — DEXTROSE AND SODIUM CHLORIDE 5; .9 G/100ML; G/100ML
INJECTION, SOLUTION INTRAVENOUS CONTINUOUS
Status: DISCONTINUED | OUTPATIENT
Start: 2018-09-30 | End: 2018-10-03 | Stop reason: HOSPADM

## 2018-09-30 RX ORDER — LOSARTAN POTASSIUM 100 MG/1
100 TABLET ORAL DAILY
Status: DISCONTINUED | OUTPATIENT
Start: 2018-09-30 | End: 2018-10-03 | Stop reason: HOSPADM

## 2018-09-30 RX ORDER — LACTOBACILLUS RHAMNOSUS GG 10B CELL
1 CAPSULE ORAL DAILY
Status: DISCONTINUED | OUTPATIENT
Start: 2018-09-30 | End: 2018-10-03 | Stop reason: HOSPADM

## 2018-09-30 RX ORDER — ACETAMINOPHEN 325 MG/1
650 TABLET ORAL EVERY 4 HOURS PRN
Status: DISCONTINUED | OUTPATIENT
Start: 2018-09-30 | End: 2018-10-01 | Stop reason: SDUPTHER

## 2018-09-30 RX ORDER — POTASSIUM CHLORIDE 7.45 MG/ML
10 INJECTION INTRAVENOUS PRN
Status: DISCONTINUED | OUTPATIENT
Start: 2018-09-30 | End: 2018-10-03 | Stop reason: HOSPADM

## 2018-09-30 RX ORDER — POTASSIUM CHLORIDE 20 MEQ/1
40 TABLET, EXTENDED RELEASE ORAL PRN
Status: DISCONTINUED | OUTPATIENT
Start: 2018-09-30 | End: 2018-10-03 | Stop reason: HOSPADM

## 2018-09-30 RX ORDER — FERROUS SULFATE 325(65) MG
325 TABLET ORAL 2 TIMES DAILY
Status: DISCONTINUED | OUTPATIENT
Start: 2018-09-30 | End: 2018-10-03 | Stop reason: HOSPADM

## 2018-09-30 RX ADMIN — Medication 1 CAPSULE: at 12:20

## 2018-09-30 RX ADMIN — Medication 125 MG: at 06:06

## 2018-09-30 RX ADMIN — Medication 1 MG: at 01:10

## 2018-09-30 RX ADMIN — DEXTROSE AND SODIUM CHLORIDE: 5; 900 INJECTION, SOLUTION INTRAVENOUS at 19:41

## 2018-09-30 RX ADMIN — Medication 125 MG: at 13:33

## 2018-09-30 RX ADMIN — POTASSIUM CHLORIDE 40 MEQ: 40 SOLUTION ORAL at 13:32

## 2018-09-30 RX ADMIN — Medication 125 MG: at 23:35

## 2018-09-30 RX ADMIN — LOSARTAN POTASSIUM 100 MG: 100 TABLET ORAL at 12:20

## 2018-09-30 RX ADMIN — ACETAMINOPHEN 650 MG: 325 TABLET, FILM COATED ORAL at 01:10

## 2018-09-30 RX ADMIN — Medication 125 MG: at 17:58

## 2018-09-30 RX ADMIN — AMLODIPINE BESYLATE 5 MG: 5 TABLET ORAL at 12:20

## 2018-09-30 RX ADMIN — ACETAMINOPHEN 650 MG: 325 TABLET, FILM COATED ORAL at 19:34

## 2018-09-30 ASSESSMENT — PAIN SCALES - GENERAL
PAINLEVEL_OUTOF10: 0
PAINLEVEL_OUTOF10: 3
PAINLEVEL_OUTOF10: 0
PAINLEVEL_OUTOF10: 3
PAINLEVEL_OUTOF10: 0

## 2018-09-30 NOTE — CARE COORDINATION
Notified, Pt's Daughter In Gail Blank, that pt. Was converted to Inpt Status, she would like her , Pt's POA, to look over letter. Will have DC planner follow for peter.

## 2018-10-01 PROCEDURE — 99232 SBSQ HOSP IP/OBS MODERATE 35: CPT | Performed by: FAMILY MEDICINE

## 2018-10-01 PROCEDURE — 6370000000 HC RX 637 (ALT 250 FOR IP): Performed by: FAMILY MEDICINE

## 2018-10-01 PROCEDURE — 1200000000 HC SEMI PRIVATE

## 2018-10-01 RX ORDER — DOXYCYCLINE 100 MG/1
100 CAPSULE ORAL EVERY 12 HOURS SCHEDULED
Status: DISCONTINUED | OUTPATIENT
Start: 2018-10-01 | End: 2018-10-03 | Stop reason: HOSPADM

## 2018-10-01 RX ADMIN — LOSARTAN POTASSIUM 100 MG: 100 TABLET ORAL at 08:49

## 2018-10-01 RX ADMIN — Medication 1 MG: at 00:43

## 2018-10-01 RX ADMIN — LEVOTHYROXINE SODIUM 25 MCG: 25 TABLET ORAL at 05:58

## 2018-10-01 RX ADMIN — Medication 125 MG: at 05:58

## 2018-10-01 RX ADMIN — Medication 125 MG: at 18:12

## 2018-10-01 RX ADMIN — DOXYCYCLINE 100 MG: 100 CAPSULE ORAL at 08:49

## 2018-10-01 RX ADMIN — Medication 1 MG: at 21:04

## 2018-10-01 RX ADMIN — DOXYCYCLINE 100 MG: 100 CAPSULE ORAL at 21:04

## 2018-10-01 RX ADMIN — Medication 125 MG: at 11:13

## 2018-10-01 RX ADMIN — AMLODIPINE BESYLATE 5 MG: 5 TABLET ORAL at 08:49

## 2018-10-01 RX ADMIN — Medication 1 CAPSULE: at 08:49

## 2018-10-01 ASSESSMENT — PAIN SCALES - GENERAL
PAINLEVEL_OUTOF10: 0
PAINLEVEL_OUTOF10: 0

## 2018-10-02 LAB
ANION GAP SERPL CALCULATED.3IONS-SCNC: 10 MMOL/L (ref 9–17)
BUN BLDV-MCNC: 7 MG/DL (ref 8–23)
BUN/CREAT BLD: ABNORMAL (ref 9–20)
CALCIUM SERPL-MCNC: 7.8 MG/DL (ref 8.6–10.4)
CHLORIDE BLD-SCNC: 107 MMOL/L (ref 98–107)
CO2: 23 MMOL/L (ref 20–31)
CREAT SERPL-MCNC: 0.52 MG/DL (ref 0.5–0.9)
GFR AFRICAN AMERICAN: >60 ML/MIN
GFR NON-AFRICAN AMERICAN: >60 ML/MIN
GFR SERPL CREATININE-BSD FRML MDRD: ABNORMAL ML/MIN/{1.73_M2}
GFR SERPL CREATININE-BSD FRML MDRD: ABNORMAL ML/MIN/{1.73_M2}
GLUCOSE BLD-MCNC: 107 MG/DL (ref 70–99)
POTASSIUM SERPL-SCNC: 3.6 MMOL/L (ref 3.7–5.3)
SODIUM BLD-SCNC: 140 MMOL/L (ref 135–144)

## 2018-10-02 PROCEDURE — G8978 MOBILITY CURRENT STATUS: HCPCS

## 2018-10-02 PROCEDURE — 80048 BASIC METABOLIC PNL TOTAL CA: CPT

## 2018-10-02 PROCEDURE — G8979 MOBILITY GOAL STATUS: HCPCS

## 2018-10-02 PROCEDURE — 6370000000 HC RX 637 (ALT 250 FOR IP): Performed by: FAMILY MEDICINE

## 2018-10-02 PROCEDURE — 36415 COLL VENOUS BLD VENIPUNCTURE: CPT

## 2018-10-02 PROCEDURE — 2580000003 HC RX 258: Performed by: FAMILY MEDICINE

## 2018-10-02 PROCEDURE — 94664 DEMO&/EVAL PT USE INHALER: CPT

## 2018-10-02 PROCEDURE — 97162 PT EVAL MOD COMPLEX 30 MIN: CPT

## 2018-10-02 PROCEDURE — 1200000000 HC SEMI PRIVATE

## 2018-10-02 PROCEDURE — 97530 THERAPEUTIC ACTIVITIES: CPT

## 2018-10-02 PROCEDURE — 99232 SBSQ HOSP IP/OBS MODERATE 35: CPT | Performed by: FAMILY MEDICINE

## 2018-10-02 RX ADMIN — ACETAMINOPHEN 650 MG: 325 TABLET, FILM COATED ORAL at 15:13

## 2018-10-02 RX ADMIN — Medication 1 CAPSULE: at 07:24

## 2018-10-02 RX ADMIN — DOXYCYCLINE 100 MG: 100 CAPSULE ORAL at 07:24

## 2018-10-02 RX ADMIN — Medication 125 MG: at 17:15

## 2018-10-02 RX ADMIN — DEXTROSE AND SODIUM CHLORIDE: 5; 900 INJECTION, SOLUTION INTRAVENOUS at 14:28

## 2018-10-02 RX ADMIN — Medication 125 MG: at 06:24

## 2018-10-02 RX ADMIN — ACETAMINOPHEN 650 MG: 325 TABLET, FILM COATED ORAL at 06:24

## 2018-10-02 RX ADMIN — Medication 125 MG: at 00:41

## 2018-10-02 RX ADMIN — LEVOTHYROXINE SODIUM 25 MCG: 25 TABLET ORAL at 06:24

## 2018-10-02 RX ADMIN — Medication 125 MG: at 23:56

## 2018-10-02 RX ADMIN — DOXYCYCLINE 100 MG: 100 CAPSULE ORAL at 19:39

## 2018-10-02 RX ADMIN — LOSARTAN POTASSIUM 100 MG: 100 TABLET ORAL at 07:24

## 2018-10-02 RX ADMIN — AMLODIPINE BESYLATE 5 MG: 5 TABLET ORAL at 07:24

## 2018-10-02 RX ADMIN — Medication 125 MG: at 11:22

## 2018-10-02 RX ADMIN — Medication 1 MG: at 19:39

## 2018-10-02 ASSESSMENT — PAIN SCALES - GENERAL
PAINLEVEL_OUTOF10: 7
PAINLEVEL_OUTOF10: 7
PAINLEVEL_OUTOF10: 6
PAINLEVEL_OUTOF10: 3
PAINLEVEL_OUTOF10: 6

## 2018-10-02 ASSESSMENT — PAIN DESCRIPTION - LOCATION
LOCATION: ABDOMEN
LOCATION: GENERALIZED

## 2018-10-02 ASSESSMENT — PAIN DESCRIPTION - PAIN TYPE
TYPE: ACUTE PAIN
TYPE: ACUTE PAIN

## 2018-10-02 NOTE — PLAN OF CARE
Problem: Falls - Risk of:  Goal: Will remain free from falls  Will remain free from falls   Outcome: Ongoing  No falls noted this shift. Patient ambulates with x1 staff assistance without difficulty. Bed kept in low position. Safe environment maintained. Bedside table & call light in reach. Uses call light appropriately when needing assistance. Problem: Risk for Impaired Skin Integrity  Goal: Tissue integrity - skin and mucous membranes  Structural intactness and normal physiological function of skin and  mucous membranes. Outcome: Ongoing  Skin assessment performed. See head to toe assessment. Will continue to monitor. Problem: Pain:  Goal: Pain level will decrease  Pain level will decrease   Outcome: Ongoing  Pt medicated with pain medication prn. Assessed all pain characteristics including level, type, location, frequency, and onset. Non-pharmacologic interventions offered to pt as well. Pt states pain is tolerable at this time. Will continue to monitor.

## 2018-10-03 VITALS
HEART RATE: 83 BPM | TEMPERATURE: 98.3 F | DIASTOLIC BLOOD PRESSURE: 46 MMHG | BODY MASS INDEX: 28.47 KG/M2 | SYSTOLIC BLOOD PRESSURE: 146 MMHG | RESPIRATION RATE: 16 BRPM | OXYGEN SATURATION: 98 % | HEIGHT: 60 IN | WEIGHT: 145 LBS

## 2018-10-03 LAB
ABSOLUTE EOS #: 0.3 K/UL (ref 0–0.4)
ABSOLUTE IMMATURE GRANULOCYTE: ABNORMAL K/UL (ref 0–0.3)
ABSOLUTE LYMPH #: 1.3 K/UL (ref 1–4.8)
ABSOLUTE MONO #: 0.8 K/UL (ref 0.1–1.3)
ANION GAP SERPL CALCULATED.3IONS-SCNC: 9 MMOL/L (ref 9–17)
BASOPHILS # BLD: 0 % (ref 0–2)
BASOPHILS ABSOLUTE: 0 K/UL (ref 0–0.2)
BUN BLDV-MCNC: 6 MG/DL (ref 8–23)
BUN/CREAT BLD: ABNORMAL (ref 9–20)
CALCIUM SERPL-MCNC: 7.8 MG/DL (ref 8.6–10.4)
CHLORIDE BLD-SCNC: 106 MMOL/L (ref 98–107)
CO2: 25 MMOL/L (ref 20–31)
CREAT SERPL-MCNC: 0.43 MG/DL (ref 0.5–0.9)
DIFFERENTIAL TYPE: ABNORMAL
EOSINOPHILS RELATIVE PERCENT: 3 % (ref 0–4)
GFR AFRICAN AMERICAN: >60 ML/MIN
GFR NON-AFRICAN AMERICAN: >60 ML/MIN
GFR SERPL CREATININE-BSD FRML MDRD: ABNORMAL ML/MIN/{1.73_M2}
GFR SERPL CREATININE-BSD FRML MDRD: ABNORMAL ML/MIN/{1.73_M2}
GLUCOSE BLD-MCNC: 111 MG/DL (ref 70–99)
HCT VFR BLD CALC: 31.2 % (ref 36–46)
HEMOGLOBIN: 9.9 G/DL (ref 12–16)
IMMATURE GRANULOCYTES: ABNORMAL %
LYMPHOCYTES # BLD: 11 % (ref 24–44)
MCH RBC QN AUTO: 27 PG (ref 26–34)
MCHC RBC AUTO-ENTMCNC: 31.7 G/DL (ref 31–37)
MCV RBC AUTO: 85.4 FL (ref 80–100)
MONOCYTES # BLD: 7 % (ref 1–7)
NRBC AUTOMATED: ABNORMAL PER 100 WBC
PDW BLD-RTO: 16.7 % (ref 11.5–14.9)
PLATELET # BLD: 396 K/UL (ref 150–450)
PLATELET ESTIMATE: ABNORMAL
PMV BLD AUTO: 7.7 FL (ref 6–12)
POTASSIUM SERPL-SCNC: 3.1 MMOL/L (ref 3.7–5.3)
RBC # BLD: 3.65 M/UL (ref 4–5.2)
RBC # BLD: ABNORMAL 10*6/UL
SEG NEUTROPHILS: 79 % (ref 36–66)
SEGMENTED NEUTROPHILS ABSOLUTE COUNT: 8.6 K/UL (ref 1.3–9.1)
SODIUM BLD-SCNC: 140 MMOL/L (ref 135–144)
WBC # BLD: 11 K/UL (ref 3.5–11)
WBC # BLD: ABNORMAL 10*3/UL

## 2018-10-03 PROCEDURE — 85025 COMPLETE CBC W/AUTO DIFF WBC: CPT

## 2018-10-03 PROCEDURE — 6370000000 HC RX 637 (ALT 250 FOR IP): Performed by: FAMILY MEDICINE

## 2018-10-03 PROCEDURE — 2580000003 HC RX 258: Performed by: FAMILY MEDICINE

## 2018-10-03 PROCEDURE — 36415 COLL VENOUS BLD VENIPUNCTURE: CPT

## 2018-10-03 PROCEDURE — 99239 HOSP IP/OBS DSCHRG MGMT >30: CPT | Performed by: FAMILY MEDICINE

## 2018-10-03 PROCEDURE — 80048 BASIC METABOLIC PNL TOTAL CA: CPT

## 2018-10-03 RX ORDER — DOXYCYCLINE 100 MG/1
100 CAPSULE ORAL EVERY 12 HOURS SCHEDULED
Qty: 6 CAPSULE | Refills: 0
Start: 2018-10-03 | End: 2018-10-06

## 2018-10-03 RX ADMIN — POTASSIUM CHLORIDE 40 MEQ: 40 SOLUTION ORAL at 08:19

## 2018-10-03 RX ADMIN — Medication 125 MG: at 06:46

## 2018-10-03 RX ADMIN — DOXYCYCLINE 100 MG: 100 CAPSULE ORAL at 08:18

## 2018-10-03 RX ADMIN — ACETAMINOPHEN 650 MG: 325 TABLET, FILM COATED ORAL at 17:23

## 2018-10-03 RX ADMIN — DEXTROSE AND SODIUM CHLORIDE: 5; 900 INJECTION, SOLUTION INTRAVENOUS at 08:18

## 2018-10-03 RX ADMIN — AMLODIPINE BESYLATE 5 MG: 5 TABLET ORAL at 08:18

## 2018-10-03 RX ADMIN — LEVOTHYROXINE SODIUM 25 MCG: 25 TABLET ORAL at 06:46

## 2018-10-03 RX ADMIN — LOSARTAN POTASSIUM 100 MG: 100 TABLET ORAL at 08:18

## 2018-10-03 RX ADMIN — Medication 125 MG: at 13:46

## 2018-10-03 RX ADMIN — Medication 1 CAPSULE: at 08:18

## 2018-10-03 ASSESSMENT — PAIN SCALES - GENERAL: PAINLEVEL_OUTOF10: 4

## 2018-10-03 NOTE — CARE COORDINATION
Notified Alena Frye from [facility] ([facility]) that pt will be d/c'ed to [facility] today. He will follow her there.     Electronically signed by Omar Lopes RN on 10/3/2018 at 4:11 PM

## 2018-10-06 LAB
CULTURE: NORMAL
CULTURE: NORMAL
Lab: NORMAL
Lab: NORMAL
SPECIMEN DESCRIPTION: NORMAL
SPECIMEN DESCRIPTION: NORMAL
STATUS: NORMAL
STATUS: NORMAL

## 2018-10-10 ENCOUNTER — CARE COORDINATION (OUTPATIENT)
Dept: CARE COORDINATION | Age: 83
End: 2018-10-10

## 2018-10-11 ENCOUNTER — HOSPITAL ENCOUNTER (OUTPATIENT)
Dept: WOUND CARE | Age: 83
Discharge: HOME | End: 2018-10-11
Payer: MEDICARE

## 2018-10-11 VITALS
HEART RATE: 85 BPM | SYSTOLIC BLOOD PRESSURE: 152 MMHG | WEIGHT: 145 LBS | DIASTOLIC BLOOD PRESSURE: 65 MMHG | RESPIRATION RATE: 18 BRPM | TEMPERATURE: 98.5 F | BODY MASS INDEX: 28.47 KG/M2 | HEIGHT: 60 IN

## 2018-10-11 DIAGNOSIS — S31.109A OPEN WOUND OF ABDOMEN, INITIAL ENCOUNTER: ICD-10-CM

## 2018-10-11 PROCEDURE — 6370000000 HC RX 637 (ALT 250 FOR IP): Performed by: NURSE PRACTITIONER

## 2018-10-11 PROCEDURE — 11042 DBRDMT SUBQ TIS 1ST 20SQCM/<: CPT

## 2018-10-11 PROCEDURE — 11042 DBRDMT SUBQ TIS 1ST 20SQCM/<: CPT | Performed by: NURSE PRACTITIONER

## 2018-10-11 PROCEDURE — 99214 OFFICE O/P EST MOD 30 MIN: CPT

## 2018-10-11 RX ORDER — LIDOCAINE HYDROCHLORIDE 40 MG/ML
SOLUTION TOPICAL ONCE
Status: COMPLETED | OUTPATIENT
Start: 2018-10-11 | End: 2018-10-11

## 2018-10-11 RX ADMIN — LIDOCAINE HYDROCHLORIDE 10 ML: 40 SOLUTION TOPICAL at 15:36

## 2018-10-11 ASSESSMENT — PAIN SCALES - GENERAL: PAINLEVEL_OUTOF10: 0

## 2018-10-11 NOTE — PROGRESS NOTES
July    HERNIA REPAIR  6/4/14    ventral hernia repair    HIATAL HERNIA REPAIR      HYSTERECTOMY      with unilateral salpingo-oopherectomy    MOHS SURGERY Right 11/28/2016    Dr. Osmin Henson- [facility]    MN EGD TRANSORAL BIOPSY SINGLE/MULTIPLE N/A 7/6/2017    EGD BIOPSY performed by Joe Dailey MD at [facility]      multiple    UPPER GASTROINTESTINAL ENDOSCOPY  07/06/2017    gastritis, gastric polyps       FAMILY HISTORY    Family History   Problem Relation Age of Onset    Other Father         kidney dz, bright`s dz    Hypertension Brother         2 brothers. heart dz    Heart Disease Other     Arthritis Mother        SOCIAL HISTORY    Social History   Substance Use Topics    Smoking status: Never Smoker    Smokeless tobacco: Never Used    Alcohol use No      Comment: occasional       ALLERGIES    Allergies   Allergen Reactions    Ace Inhibitors Other (See Comments)    Sporanox [Itraconazole] Other (See Comments)       MEDICATIONS    Current Outpatient Prescriptions on File Prior to Encounter   Medication Sig Dispense Refill    vancomycin (FIRVANQ) 50 MG/ML SOLR oral solution Take 2.5 mLs by mouth 4 times daily for 14 days 140 mL 0    diclofenac sodium 1 % GEL Apply 2 g topically 2 times daily 1 Tube 3    Multiple Vitamins-Minerals (MULTI COMPLETE PO) Take 1 tablet by mouth daily      amLODIPine (NORVASC) 5 MG tablet Take 1 tablet by mouth daily 90 tablet 3    ferrous sulfate (FE TABS) 325 (65 Fe) MG EC tablet Take 1 tablet by mouth 2 times daily 90 tablet 3    Lactobacillus (PROBIOTIC ACIDOPHILUS) TABS Take 1 tablet by mouth daily 30 tablet 0    levothyroxine (SYNTHROID) 25 MCG tablet Take 1 tablet by mouth Daily 90 tablet 3    losartan (COZAAR) 100 MG tablet Take 1 tablet by mouth daily 90 tablet 3    acetaminophen 650 MG TABS Take 650 mg by mouth every 4 hours as needed. 120 tablet 0    melatonin 3 MG TABS tablet Take 3 mg by mouth nightly.       multivitamin (OCUVITE) TABS per tablet Take 1 tablet by mouth daily. No current facility-administered medications on file prior to encounter. REVIEW OF SYSTEMS    Pertinent items are noted in HPI. Objective:     Patient Active Problem List   Diagnosis Code    Hypertension I10    Hyperlipidemia E78.5    Hard of hearing H91.90    Anemia D64.9    Hypothyroid E03.9    GERD (gastroesophageal reflux disease) K21.9    Diverticulosis K57.90    Urinary incontinence R32    Osteoarthritis M19.90    Leaky heart valve I38    S/P cholecystectomy Z90.49    Clostridium difficile colitis A04.72    Dehydration E86.0    Hypokalemia E87.6    Colitis presumed infectious K52.9    Open wound of abdomen S31.109A        BP (!) 152/65   Pulse 85   Temp 98.5 °F (36.9 °C) (Tympanic)   Resp 18   Ht 5' (1.524 m)   Wt 145 lb (65.8 kg)   BMI 28.32 kg/m²       Wt Readings from Last 3 Encounters:   10/11/18 145 lb (65.8 kg)   09/29/18 145 lb (65.8 kg)   09/06/18 145 lb (65.8 kg)       PHYSICAL EXAM    General Appearance: in no acute distress and alert  Skin: warm and dry and small open area just distal to surgical scar. No surrounding erythema, minimal drainage      Assessment:      Patient Active Problem List   Diagnosis Code    Hypertension I10    Hyperlipidemia E78.5    Hard of hearing H91.90    Anemia D64.9    Hypothyroid E03.9    GERD (gastroesophageal reflux disease) K21.9    Diverticulosis K57.90    Urinary incontinence R32    Osteoarthritis M19.90    Leaky heart valve I38    S/P cholecystectomy Z90.49    Clostridium difficile colitis A04.72    Dehydration E86.0    Hypokalemia E87.6    Colitis presumed infectious K52.9    Open wound of abdomen S31.109A        Procedure Note  Indications:  Based on my examination of this patient's wound(s)/ulcer(s) today, debridement is required to promote healing and evaluate the wound base.     Performed by: CHANDU Hanna CNP    Consent obtained:  Yes    Time

## 2018-10-19 ENCOUNTER — HOSPITAL ENCOUNTER (OUTPATIENT)
Dept: WOUND CARE | Age: 83
Discharge: HOME | End: 2018-10-19
Payer: MEDICARE

## 2018-10-19 PROCEDURE — 99211 OFF/OP EST MAY X REQ PHY/QHP: CPT

## 2018-10-19 NOTE — PROGRESS NOTES
[facility]ella Riddle 37   Progress Note and Procedure Note      [address] RECORD NUMBER:  [id number]  AGE: 80 y.o. GENDER: female  : 1927  EPISODE DATE:  10/19/2018    Subjective:     No chief complaint on file. HISTORY of PRESENT ILLNESS HPI     Solange Lopes is a 80 y.o. female who presents today for wound/ulcer evaluation.    History of Wound Context: right abdominal wound at cholecystectomy port site  Wound/Ulcer Pain Timing/Severity: none    Wound is well healed    Ulcer Identification:  Ulcer Type: non-healing surgical  Contributing Factors: obesity    Wound: Surgical incision        PAST MEDICAL HISTORY        Diagnosis Date    Anemia     Blood transfusion reaction     Diverticulosis     GERD (gastroesophageal reflux disease)     Hard of hearing     both ears, wears hearing aids    Hyperlipidemia     hx of    Hypertension     Hypothyroid     Leaky heart valve     Lentigo     Osteoarthritis     Palpitations     Temporal arteritis (HCC)     Urinary incontinence     Wears glasses     driving and reading    Wears partial dentures     lower       PAST SURGICAL HISTORY    Past Surgical History:   Procedure Laterality Date    ABDOMEN SURGERY  14    removal of infected mesh w replacement with xen matrix mesh    APPENDECTOMY      BLADDER SUSPENSION      CARPAL TUNNEL RELEASE Bilateral     right in , left in     CATARACT REMOVAL Bilateral     CHOLECYSTECTOMY N/A 2018    CHOLECYSTECTOMY WITH INTRAOPERATIVE CHOLANGIOGRAM performed by Colton Oswald MD at [address] [place] [address]      multiple    GASTRIC FUNDOPLICATION      with exploratory laparoscopy    GASTROSTOMY TUBE PLACEMENT  14    Removed end of July    HERNIA REPAIR  14    ventral hernia repair    HIATAL HERNIA REPAIR      HYSTERECTOMY      with unilateral salpingo-oopherectomy    MOHS SURGERY Right 2016    Dr. Adeel [place]    MN EGD TRANSORAL BIOPSY SINGLE/MULTIPLE N/A

## 2018-10-30 PROBLEM — E86.0 DEHYDRATION: Status: RESOLVED | Noted: 2018-09-30 | Resolved: 2018-10-30

## 2018-11-02 ENCOUNTER — CARE COORDINATION (OUTPATIENT)
Dept: CARE COORDINATION | Age: 83
End: 2018-11-02

## 2018-11-02 NOTE — CARE COORDINATION
Ambulatory Care Coordination Note  11/2/2018  CM Risk Score: 9  Donny Mortality Risk Score: 28    ACC: Romain Cortez RN    Summary Note: She was discharged from [facility] 10/25/18, was there 3 weeks after being hospitalized for C diff colitis. She stated she is feeling well right now, eating good, no diarrhea, no pain. She had a few wound care visits to check on her drain site right abdomen after her cholecystectomy in September. It is closed and healed now. She is getting home health visits but didn't know what agency. She had [facility] prior to admission, I attempted to call 3 times but phone is not connecting there. Her last PCP appt was 10/18/18, she doesn't feel she needs another one right now, will call if she needs any medications refilled. She manages them herself and  fills her pill boxes every Friday. CC Plan:   -Follow in one month to check on any symptoms, home health. General Assessment    Do you have any symptoms that are causing concern?:  No               Care Coordination Interventions    Program Enrollment:  Complex Care  Referral from Primary Care Provider:  No  Suggested Interventions and Community Resources  Home Health Services:  Completed (Comment: [facility])  Medi Set or Pill Pack:  Declined  Physical Therapy:  Completed (Comment: [facility])         Goals Addressed      Conditions and Symptoms                  I will schedule office visits, as directed by my provider. I will keep my appointment or reschedule if I have to cancel. I will notify my provider of any barriers to my plan of care. I will follow my Zone Management tool to seek urgent or emergent care. I will notify my provider of any symptoms that indicate a worsening of my condition. Barriers: none  Plan for overcoming my barriers: N/A  Confidence: 9/10  Anticipated Goal Completion Date: 2/28/19              Prior to Admission medications    Medication Sig Start Date End Date Taking?  Authorizing

## 2018-12-07 ENCOUNTER — CARE COORDINATION (OUTPATIENT)
Dept: CARE COORDINATION | Age: 83
End: 2018-12-07

## 2018-12-07 NOTE — CARE COORDINATION
condition. Barriers: none  Plan for overcoming my barriers: N/A  Confidence: 9/10  Anticipated Goal Completion Date: 2/28/19              Prior to Admission medications    Medication Sig Start Date End Date Taking? Authorizing Provider   diclofenac sodium 1 % GEL Apply 2 g topically 2 times daily 8/23/18   Dustin Graham MD   Multiple Vitamins-Minerals (MULTI COMPLETE PO) Take 1 tablet by mouth daily    Historical Provider, MD   amLODIPine (NORVASC) 5 MG tablet Take 1 tablet by mouth daily 6/11/18   Dustin Graham MD   ferrous sulfate (FE TABS) 325 (65 Fe) MG EC tablet Take 1 tablet by mouth 2 times daily 6/11/18   Dustin Graham MD   Lactobacillus (PROBIOTIC ACIDOPHILUS) TABS Take 1 tablet by mouth daily 5/21/18   Dustin Graham MD   levothyroxine (SYNTHROID) 25 MCG tablet Take 1 tablet by mouth Daily 10/25/17   Dustin Graham MD   losartan (COZAAR) 100 MG tablet Take 1 tablet by mouth daily 10/25/17   Dustin Graham MD   acetaminophen 650 MG TABS Take 650 mg by mouth every 4 hours as needed. 7/9/14   Dustin Graham MD   melatonin 3 MG TABS tablet Take 3 mg by mouth nightly. Historical Provider, MD   multivitamin (OCUVITE) TABS per tablet Take 1 tablet by mouth daily.     Historical Provider, MD       Future Appointments  Date Time Provider Jame Delgado   1/3/2019 3:40 PM Dustin Graham MD [facility]

## 2018-12-15 RX ORDER — LEVOTHYROXINE SODIUM 0.03 MG/1
25 TABLET ORAL DAILY
Qty: 90 TABLET | Refills: 3 | Status: CANCELLED | OUTPATIENT
Start: 2018-12-15

## 2018-12-15 RX ORDER — LOSARTAN POTASSIUM 100 MG/1
100 TABLET ORAL DAILY
Qty: 90 TABLET | Refills: 3 | Status: CANCELLED | OUTPATIENT
Start: 2018-12-15

## 2018-12-17 RX ORDER — LOSARTAN POTASSIUM 100 MG/1
100 TABLET ORAL DAILY
Qty: 90 TABLET | Refills: 3 | Status: SHIPPED | OUTPATIENT
Start: 2018-12-17

## 2018-12-17 RX ORDER — LEVOTHYROXINE SODIUM 0.03 MG/1
25 TABLET ORAL DAILY
Qty: 90 TABLET | Refills: 3 | Status: SHIPPED | OUTPATIENT
Start: 2018-12-17

## 2018-12-17 RX ORDER — AMLODIPINE BESYLATE 5 MG/1
5 TABLET ORAL DAILY
Qty: 90 TABLET | Refills: 3 | Status: SHIPPED | OUTPATIENT
Start: 2018-12-17

## 2019-01-03 ENCOUNTER — OFFICE VISIT (OUTPATIENT)
Dept: PRIMARY CARE CLINIC | Age: 84
End: 2019-01-03
Payer: MEDICARE

## 2019-01-03 VITALS
SYSTOLIC BLOOD PRESSURE: 136 MMHG | DIASTOLIC BLOOD PRESSURE: 70 MMHG | OXYGEN SATURATION: 96 % | HEART RATE: 77 BPM | WEIGHT: 141.6 LBS | BODY MASS INDEX: 27.65 KG/M2

## 2019-01-03 DIAGNOSIS — E03.9 ACQUIRED HYPOTHYROIDISM: Chronic | ICD-10-CM

## 2019-01-03 DIAGNOSIS — I10 ESSENTIAL HYPERTENSION: Primary | Chronic | ICD-10-CM

## 2019-01-03 DIAGNOSIS — D51.9 ANEMIA DUE TO VITAMIN B12 DEFICIENCY, UNSPECIFIED B12 DEFICIENCY TYPE: Chronic | ICD-10-CM

## 2019-01-03 PROCEDURE — 1036F TOBACCO NON-USER: CPT | Performed by: FAMILY MEDICINE

## 2019-01-03 PROCEDURE — 4040F PNEUMOC VAC/ADMIN/RCVD: CPT | Performed by: FAMILY MEDICINE

## 2019-01-03 PROCEDURE — 1123F ACP DISCUSS/DSCN MKR DOCD: CPT | Performed by: FAMILY MEDICINE

## 2019-01-03 PROCEDURE — G8427 DOCREV CUR MEDS BY ELIG CLIN: HCPCS | Performed by: FAMILY MEDICINE

## 2019-01-03 PROCEDURE — 99214 OFFICE O/P EST MOD 30 MIN: CPT | Performed by: FAMILY MEDICINE

## 2019-01-03 PROCEDURE — 1101F PT FALLS ASSESS-DOCD LE1/YR: CPT | Performed by: FAMILY MEDICINE

## 2019-01-03 PROCEDURE — 1090F PRES/ABSN URINE INCON ASSESS: CPT | Performed by: FAMILY MEDICINE

## 2019-01-03 PROCEDURE — G8484 FLU IMMUNIZE NO ADMIN: HCPCS | Performed by: FAMILY MEDICINE

## 2019-01-03 PROCEDURE — G8417 CALC BMI ABV UP PARAM F/U: HCPCS | Performed by: FAMILY MEDICINE

## 2019-01-03 ASSESSMENT — ENCOUNTER SYMPTOMS
CONSTIPATION: 0
VOMITING: 0
ABDOMINAL PAIN: 0
RHINORRHEA: 0
NAUSEA: 0
WHEEZING: 0
COUGH: 0
SORE THROAT: 0
EYE DISCHARGE: 0
DIARRHEA: 0
EYE REDNESS: 0
SHORTNESS OF BREATH: 0

## 2019-01-10 ENCOUNTER — CARE COORDINATION (OUTPATIENT)
Dept: CARE COORDINATION | Age: 84
End: 2019-01-10

## 2019-02-14 ENCOUNTER — APPOINTMENT (OUTPATIENT)
Dept: CT IMAGING | Age: 84
DRG: 690 | End: 2019-02-14
Payer: MEDICARE

## 2019-02-14 ENCOUNTER — HOSPITAL ENCOUNTER (INPATIENT)
Age: 84
LOS: 3 days | Discharge: HOME | DRG: 690 | End: 2019-02-17
Attending: EMERGENCY MEDICINE | Admitting: INTERNAL MEDICINE
Payer: MEDICARE

## 2019-02-14 ENCOUNTER — APPOINTMENT (OUTPATIENT)
Dept: GENERAL RADIOLOGY | Age: 84
DRG: 690 | End: 2019-02-14
Payer: MEDICARE

## 2019-02-14 DIAGNOSIS — R55 SYNCOPE AND COLLAPSE: Primary | ICD-10-CM

## 2019-02-14 LAB
-: ABNORMAL
ABSOLUTE EOS #: 0.1 K/UL (ref 0–0.4)
ABSOLUTE IMMATURE GRANULOCYTE: ABNORMAL K/UL (ref 0–0.3)
ABSOLUTE LYMPH #: 1 K/UL (ref 1–4.8)
ABSOLUTE MONO #: 0.5 K/UL (ref 0.1–1.3)
ALBUMIN SERPL-MCNC: 3.7 G/DL (ref 3.5–5.2)
ALBUMIN/GLOBULIN RATIO: ABNORMAL (ref 1–2.5)
ALP BLD-CCNC: 144 U/L (ref 35–104)
ALT SERPL-CCNC: 212 U/L (ref 5–33)
AMORPHOUS: ABNORMAL
ANION GAP SERPL CALCULATED.3IONS-SCNC: 12 MMOL/L (ref 9–17)
AST SERPL-CCNC: 604 U/L
BACTERIA: ABNORMAL
BASOPHILS # BLD: 1 % (ref 0–2)
BASOPHILS ABSOLUTE: 0 K/UL (ref 0–0.2)
BILIRUB SERPL-MCNC: 0.32 MG/DL (ref 0.3–1.2)
BILIRUBIN URINE: NEGATIVE
BUN BLDV-MCNC: 21 MG/DL (ref 8–23)
BUN/CREAT BLD: ABNORMAL (ref 9–20)
CALCIUM SERPL-MCNC: 8.8 MG/DL (ref 8.6–10.4)
CASTS UA: ABNORMAL /LPF
CHLORIDE BLD-SCNC: 107 MMOL/L (ref 98–107)
CO2: 23 MMOL/L (ref 20–31)
COLOR: YELLOW
COMMENT UA: ABNORMAL
CREAT SERPL-MCNC: 0.81 MG/DL (ref 0.5–0.9)
CRYSTALS, UA: ABNORMAL /HPF
DIFFERENTIAL TYPE: ABNORMAL
EOSINOPHILS RELATIVE PERCENT: 2 % (ref 0–4)
EPITHELIAL CELLS UA: ABNORMAL /HPF
GFR AFRICAN AMERICAN: >60 ML/MIN
GFR NON-AFRICAN AMERICAN: >60 ML/MIN
GFR SERPL CREATININE-BSD FRML MDRD: ABNORMAL ML/MIN/{1.73_M2}
GFR SERPL CREATININE-BSD FRML MDRD: ABNORMAL ML/MIN/{1.73_M2}
GLUCOSE BLD-MCNC: 119 MG/DL (ref 70–99)
GLUCOSE URINE: NEGATIVE
HCT VFR BLD CALC: 32 % (ref 36–46)
HEMOGLOBIN: 10.3 G/DL (ref 12–16)
IMMATURE GRANULOCYTES: ABNORMAL %
KETONES, URINE: NEGATIVE
LEUKOCYTE ESTERASE, URINE: ABNORMAL
LIPASE: 103 U/L (ref 13–60)
LYMPHOCYTES # BLD: 14 % (ref 24–44)
MCH RBC QN AUTO: 29.5 PG (ref 26–34)
MCHC RBC AUTO-ENTMCNC: 32.1 G/DL (ref 31–37)
MCV RBC AUTO: 91.9 FL (ref 80–100)
MONOCYTES # BLD: 7 % (ref 1–7)
MUCUS: ABNORMAL
NITRITE, URINE: POSITIVE
NRBC AUTOMATED: ABNORMAL PER 100 WBC
OTHER OBSERVATIONS UA: ABNORMAL
PDW BLD-RTO: 15.3 % (ref 11.5–14.9)
PH UA: 7 (ref 5–8)
PLATELET # BLD: 255 K/UL (ref 150–450)
PLATELET ESTIMATE: ABNORMAL
PMV BLD AUTO: 7.8 FL (ref 6–12)
POTASSIUM SERPL-SCNC: 4.1 MMOL/L (ref 3.7–5.3)
PROTEIN UA: NEGATIVE
RBC # BLD: 3.48 M/UL (ref 4–5.2)
RBC # BLD: ABNORMAL 10*6/UL
RBC UA: ABNORMAL /HPF
RENAL EPITHELIAL, UA: ABNORMAL /HPF
SEG NEUTROPHILS: 76 % (ref 36–66)
SEGMENTED NEUTROPHILS ABSOLUTE COUNT: 5.3 K/UL (ref 1.3–9.1)
SODIUM BLD-SCNC: 142 MMOL/L (ref 135–144)
SPECIFIC GRAVITY UA: 1.03 (ref 1–1.03)
TOTAL PROTEIN: 7.1 G/DL (ref 6.4–8.3)
TRICHOMONAS: ABNORMAL
TROPONIN INTERP: ABNORMAL
TROPONIN INTERP: ABNORMAL
TROPONIN T: ABNORMAL NG/ML
TROPONIN T: ABNORMAL NG/ML
TROPONIN, HIGH SENSITIVITY: 32 NG/L (ref 0–14)
TROPONIN, HIGH SENSITIVITY: 36 NG/L (ref 0–14)
TURBIDITY: ABNORMAL
URINE HGB: NEGATIVE
UROBILINOGEN, URINE: NORMAL
WBC # BLD: 6.9 K/UL (ref 3.5–11)
WBC # BLD: ABNORMAL 10*3/UL
WBC UA: ABNORMAL /HPF
YEAST: ABNORMAL

## 2019-02-14 PROCEDURE — 87186 SC STD MICRODIL/AGAR DIL: CPT

## 2019-02-14 PROCEDURE — 2060000000 HC ICU INTERMEDIATE R&B

## 2019-02-14 PROCEDURE — 2580000003 HC RX 258: Performed by: EMERGENCY MEDICINE

## 2019-02-14 PROCEDURE — 81001 URINALYSIS AUTO W/SCOPE: CPT

## 2019-02-14 PROCEDURE — 83690 ASSAY OF LIPASE: CPT

## 2019-02-14 PROCEDURE — 84484 ASSAY OF TROPONIN QUANT: CPT

## 2019-02-14 PROCEDURE — 6360000002 HC RX W HCPCS: Performed by: INTERNAL MEDICINE

## 2019-02-14 PROCEDURE — 87077 CULTURE AEROBIC IDENTIFY: CPT

## 2019-02-14 PROCEDURE — 6360000002 HC RX W HCPCS: Performed by: EMERGENCY MEDICINE

## 2019-02-14 PROCEDURE — 74177 CT ABD & PELVIS W/CONTRAST: CPT

## 2019-02-14 PROCEDURE — 36415 COLL VENOUS BLD VENIPUNCTURE: CPT

## 2019-02-14 PROCEDURE — 99285 EMERGENCY DEPT VISIT HI MDM: CPT

## 2019-02-14 PROCEDURE — 85025 COMPLETE CBC W/AUTO DIFF WBC: CPT

## 2019-02-14 PROCEDURE — 80053 COMPREHEN METABOLIC PANEL: CPT

## 2019-02-14 PROCEDURE — 2580000003 HC RX 258: Performed by: INTERNAL MEDICINE

## 2019-02-14 PROCEDURE — 87086 URINE CULTURE/COLONY COUNT: CPT

## 2019-02-14 PROCEDURE — 71045 X-RAY EXAM CHEST 1 VIEW: CPT

## 2019-02-14 PROCEDURE — 6360000004 HC RX CONTRAST MEDICATION: Performed by: EMERGENCY MEDICINE

## 2019-02-14 PROCEDURE — 96365 THER/PROPH/DIAG IV INF INIT: CPT

## 2019-02-14 RX ORDER — SODIUM CHLORIDE 0.9 % (FLUSH) 0.9 %
10 SYRINGE (ML) INJECTION 2 TIMES DAILY
Status: DISCONTINUED | OUTPATIENT
Start: 2019-02-14 | End: 2019-02-15 | Stop reason: SDUPTHER

## 2019-02-14 RX ORDER — SODIUM CHLORIDE 0.9 % (FLUSH) 0.9 %
10 SYRINGE (ML) INJECTION PRN
Status: DISCONTINUED | OUTPATIENT
Start: 2019-02-14 | End: 2019-02-17 | Stop reason: HOSPADM

## 2019-02-14 RX ORDER — SODIUM CHLORIDE 0.9 % (FLUSH) 0.9 %
10 SYRINGE (ML) INJECTION EVERY 12 HOURS SCHEDULED
Status: DISCONTINUED | OUTPATIENT
Start: 2019-02-14 | End: 2019-02-17 | Stop reason: HOSPADM

## 2019-02-14 RX ORDER — 0.9 % SODIUM CHLORIDE 0.9 %
80 INTRAVENOUS SOLUTION INTRAVENOUS ONCE
Status: COMPLETED | OUTPATIENT
Start: 2019-02-14 | End: 2019-02-14

## 2019-02-14 RX ORDER — ACETAMINOPHEN 325 MG/1
650 TABLET ORAL EVERY 4 HOURS PRN
Status: DISCONTINUED | OUTPATIENT
Start: 2019-02-14 | End: 2019-02-17 | Stop reason: HOSPADM

## 2019-02-14 RX ADMIN — ENOXAPARIN SODIUM 40 MG: 100 INJECTION SUBCUTANEOUS at 23:12

## 2019-02-14 RX ADMIN — SODIUM CHLORIDE 80 ML: 9 INJECTION, SOLUTION INTRAVENOUS at 16:29

## 2019-02-14 RX ADMIN — CEFTRIAXONE SODIUM 1 G: 1 INJECTION, POWDER, FOR SOLUTION INTRAMUSCULAR; INTRAVENOUS at 17:35

## 2019-02-14 RX ADMIN — IOVERSOL 75 ML: 741 INJECTION INTRA-ARTERIAL; INTRAVENOUS at 16:29

## 2019-02-14 RX ADMIN — Medication 10 ML: at 23:12

## 2019-02-14 ASSESSMENT — ENCOUNTER SYMPTOMS
EYES NEGATIVE: 1
SHORTNESS OF BREATH: 0
RESPIRATORY NEGATIVE: 1
COUGH: 0
GASTROINTESTINAL NEGATIVE: 1
BACK PAIN: 0
ABDOMINAL PAIN: 0

## 2019-02-15 ENCOUNTER — CLINICAL DOCUMENTATION (OUTPATIENT)
Dept: CASE MANAGEMENT | Age: 84
End: 2019-02-15

## 2019-02-15 ENCOUNTER — APPOINTMENT (OUTPATIENT)
Dept: MRI IMAGING | Age: 84
DRG: 690 | End: 2019-02-15
Payer: MEDICARE

## 2019-02-15 PROBLEM — K83.8 COMMON BILE DUCT DILATATION: Status: ACTIVE | Noted: 2019-02-15

## 2019-02-15 PROBLEM — K86.89 PANCREATIC DUCT DILATED: Status: ACTIVE | Noted: 2019-02-15

## 2019-02-15 PROBLEM — R74.8 ELEVATED LIVER ENZYMES: Status: ACTIVE | Noted: 2019-02-15

## 2019-02-15 LAB
ACETAMINOPHEN LEVEL: <5 UG/ML (ref 10–30)
ANION GAP SERPL CALCULATED.3IONS-SCNC: 10 MMOL/L (ref 9–17)
BUN BLDV-MCNC: 14 MG/DL (ref 8–23)
BUN/CREAT BLD: ABNORMAL (ref 9–20)
CALCIUM SERPL-MCNC: 9 MG/DL (ref 8.6–10.4)
CHLORIDE BLD-SCNC: 108 MMOL/L (ref 98–107)
CO2: 24 MMOL/L (ref 20–31)
CREAT SERPL-MCNC: 0.61 MG/DL (ref 0.5–0.9)
EKG ATRIAL RATE: 62 BPM
EKG P AXIS: 56 DEGREES
EKG P-R INTERVAL: 182 MS
EKG Q-T INTERVAL: 436 MS
EKG QRS DURATION: 132 MS
EKG QTC CALCULATION (BAZETT): 442 MS
EKG R AXIS: 27 DEGREES
EKG T AXIS: 20 DEGREES
EKG VENTRICULAR RATE: 62 BPM
GFR AFRICAN AMERICAN: >60 ML/MIN
GFR NON-AFRICAN AMERICAN: >60 ML/MIN
GFR SERPL CREATININE-BSD FRML MDRD: ABNORMAL ML/MIN/{1.73_M2}
GFR SERPL CREATININE-BSD FRML MDRD: ABNORMAL ML/MIN/{1.73_M2}
GLUCOSE BLD-MCNC: 92 MG/DL (ref 70–99)
HAV IGM SER IA-ACNC: NONREACTIVE
HCT VFR BLD CALC: 32.5 % (ref 36–46)
HEMOGLOBIN: 10.4 G/DL (ref 12–16)
HEPATITIS B CORE IGM ANTIBODY: NONREACTIVE
HEPATITIS B SURFACE ANTIGEN: NONREACTIVE
HEPATITIS C ANTIBODY: NONREACTIVE
INR BLD: 1.1
LV EF: 70 %
LVEF MODALITY: NORMAL
MAGNESIUM: 2.2 MG/DL (ref 1.6–2.6)
MCH RBC QN AUTO: 29.2 PG (ref 26–34)
MCHC RBC AUTO-ENTMCNC: 32.1 G/DL (ref 31–37)
MCV RBC AUTO: 90.9 FL (ref 80–100)
NRBC AUTOMATED: ABNORMAL PER 100 WBC
PDW BLD-RTO: 15 % (ref 11.5–14.9)
PLATELET # BLD: 254 K/UL (ref 150–450)
PMV BLD AUTO: 7.8 FL (ref 6–12)
POTASSIUM SERPL-SCNC: 4.2 MMOL/L (ref 3.7–5.3)
PROTHROMBIN TIME: 14.1 SEC (ref 11.8–14.6)
RBC # BLD: 3.57 M/UL (ref 4–5.2)
SODIUM BLD-SCNC: 142 MMOL/L (ref 135–144)
WBC # BLD: 5.3 K/UL (ref 3.5–11)

## 2019-02-15 PROCEDURE — 74181 MRI ABDOMEN W/O CONTRAST: CPT

## 2019-02-15 PROCEDURE — 2060000000 HC ICU INTERMEDIATE R&B

## 2019-02-15 PROCEDURE — 86301 IMMUNOASSAY TUMOR CA 19-9: CPT

## 2019-02-15 PROCEDURE — 82105 ALPHA-FETOPROTEIN SERUM: CPT

## 2019-02-15 PROCEDURE — 6370000000 HC RX 637 (ALT 250 FOR IP): Performed by: NURSE PRACTITIONER

## 2019-02-15 PROCEDURE — 82378 CARCINOEMBRYONIC ANTIGEN: CPT

## 2019-02-15 PROCEDURE — 2580000003 HC RX 258: Performed by: INTERNAL MEDICINE

## 2019-02-15 PROCEDURE — 97165 OT EVAL LOW COMPLEX 30 MIN: CPT

## 2019-02-15 PROCEDURE — 80048 BASIC METABOLIC PNL TOTAL CA: CPT

## 2019-02-15 PROCEDURE — C8929 TTE W OR WO FOL WCON,DOPPLER: HCPCS

## 2019-02-15 PROCEDURE — 99223 1ST HOSP IP/OBS HIGH 75: CPT | Performed by: INTERNAL MEDICINE

## 2019-02-15 PROCEDURE — APPNB30 APP NON BILLABLE TIME 0-30 MINS: Performed by: NURSE PRACTITIONER

## 2019-02-15 PROCEDURE — 6360000002 HC RX W HCPCS: Performed by: NURSE PRACTITIONER

## 2019-02-15 PROCEDURE — 99222 1ST HOSP IP/OBS MODERATE 55: CPT | Performed by: INTERNAL MEDICINE

## 2019-02-15 PROCEDURE — 80307 DRUG TEST PRSMV CHEM ANLYZR: CPT

## 2019-02-15 PROCEDURE — 83735 ASSAY OF MAGNESIUM: CPT

## 2019-02-15 PROCEDURE — 85610 PROTHROMBIN TIME: CPT

## 2019-02-15 PROCEDURE — 36415 COLL VENOUS BLD VENIPUNCTURE: CPT

## 2019-02-15 PROCEDURE — 85027 COMPLETE CBC AUTOMATED: CPT

## 2019-02-15 PROCEDURE — 6360000004 HC RX CONTRAST MEDICATION: Performed by: INTERNAL MEDICINE

## 2019-02-15 PROCEDURE — 80074 ACUTE HEPATITIS PANEL: CPT

## 2019-02-15 PROCEDURE — 6360000002 HC RX W HCPCS: Performed by: INTERNAL MEDICINE

## 2019-02-15 PROCEDURE — 93005 ELECTROCARDIOGRAM TRACING: CPT

## 2019-02-15 PROCEDURE — 2580000003 HC RX 258: Performed by: NURSE PRACTITIONER

## 2019-02-15 RX ORDER — VITS A,C,E/LUTEIN/MINERALS 300MCG-200
1 TABLET ORAL DAILY
Status: DISCONTINUED | OUTPATIENT
Start: 2019-02-15 | End: 2019-02-17 | Stop reason: HOSPADM

## 2019-02-15 RX ORDER — TROSPIUM CHLORIDE 20 MG/1
20 TABLET, FILM COATED ORAL
Status: DISCONTINUED | OUTPATIENT
Start: 2019-02-15 | End: 2019-02-15

## 2019-02-15 RX ORDER — ONDANSETRON 2 MG/ML
4 INJECTION INTRAMUSCULAR; INTRAVENOUS EVERY 6 HOURS PRN
Status: DISCONTINUED | OUTPATIENT
Start: 2019-02-15 | End: 2019-02-17 | Stop reason: HOSPADM

## 2019-02-15 RX ORDER — TROSPIUM CHLORIDE 20 MG/1
20 TABLET, FILM COATED ORAL NIGHTLY
Status: DISCONTINUED | OUTPATIENT
Start: 2019-02-16 | End: 2019-02-17 | Stop reason: HOSPADM

## 2019-02-15 RX ORDER — LOSARTAN POTASSIUM 100 MG/1
100 TABLET ORAL DAILY
Status: DISCONTINUED | OUTPATIENT
Start: 2019-02-15 | End: 2019-02-17 | Stop reason: HOSPADM

## 2019-02-15 RX ORDER — BISACODYL 10 MG
10 SUPPOSITORY, RECTAL RECTAL DAILY PRN
Status: DISCONTINUED | OUTPATIENT
Start: 2019-02-15 | End: 2019-02-17 | Stop reason: HOSPADM

## 2019-02-15 RX ORDER — MAGNESIUM SULFATE 1 G/100ML
1 INJECTION INTRAVENOUS PRN
Status: DISCONTINUED | OUTPATIENT
Start: 2019-02-15 | End: 2019-02-17 | Stop reason: HOSPADM

## 2019-02-15 RX ORDER — POTASSIUM CHLORIDE 7.45 MG/ML
10 INJECTION INTRAVENOUS PRN
Status: DISCONTINUED | OUTPATIENT
Start: 2019-02-15 | End: 2019-02-17 | Stop reason: HOSPADM

## 2019-02-15 RX ORDER — LEVOTHYROXINE SODIUM 0.03 MG/1
25 TABLET ORAL DAILY
Status: DISCONTINUED | OUTPATIENT
Start: 2019-02-15 | End: 2019-02-17 | Stop reason: HOSPADM

## 2019-02-15 RX ORDER — POTASSIUM CHLORIDE 20 MEQ/1
40 TABLET, EXTENDED RELEASE ORAL PRN
Status: DISCONTINUED | OUTPATIENT
Start: 2019-02-15 | End: 2019-02-17 | Stop reason: HOSPADM

## 2019-02-15 RX ORDER — AMLODIPINE BESYLATE 5 MG/1
5 TABLET ORAL DAILY
Status: DISCONTINUED | OUTPATIENT
Start: 2019-02-15 | End: 2019-02-17 | Stop reason: HOSPADM

## 2019-02-15 RX ORDER — POTASSIUM CHLORIDE 20MEQ/15ML
40 LIQUID (ML) ORAL PRN
Status: DISCONTINUED | OUTPATIENT
Start: 2019-02-15 | End: 2019-02-17 | Stop reason: HOSPADM

## 2019-02-15 RX ORDER — LACTOBACILLUS RHAMNOSUS GG 10B CELL
1 CAPSULE ORAL DAILY
Status: DISCONTINUED | OUTPATIENT
Start: 2019-02-15 | End: 2019-02-17 | Stop reason: HOSPADM

## 2019-02-15 RX ADMIN — LOSARTAN POTASSIUM 100 MG: 100 TABLET ORAL at 08:32

## 2019-02-15 RX ADMIN — AMLODIPINE BESYLATE 5 MG: 5 TABLET ORAL at 08:32

## 2019-02-15 RX ADMIN — Medication 1 CAPSULE: at 08:32

## 2019-02-15 RX ADMIN — DICLOFENAC 2 G: 10 GEL TOPICAL at 20:05

## 2019-02-15 RX ADMIN — CEFTRIAXONE SODIUM 1 G: 1 INJECTION, POWDER, FOR SOLUTION INTRAMUSCULAR; INTRAVENOUS at 17:49

## 2019-02-15 RX ADMIN — Medication 10 ML: at 20:05

## 2019-02-15 RX ADMIN — TROSPIUM CHLORIDE 20 MG: 20 TABLET, FILM COATED ORAL at 06:34

## 2019-02-15 RX ADMIN — Medication 10 ML: at 08:33

## 2019-02-15 RX ADMIN — ENOXAPARIN SODIUM 40 MG: 100 INJECTION SUBCUTANEOUS at 08:32

## 2019-02-15 RX ADMIN — PERFLUTREN 2.2 MG: 6.52 INJECTION, SUSPENSION INTRAVENOUS at 10:03

## 2019-02-15 RX ADMIN — DICLOFENAC 2 G: 10 GEL TOPICAL at 08:33

## 2019-02-15 RX ADMIN — LEVOTHYROXINE SODIUM 25 MCG: 25 TABLET ORAL at 06:34

## 2019-02-15 RX ADMIN — I-VITE, TAB 1000-60-2MG (60/BT) 1 TABLET: TAB at 08:32

## 2019-02-16 LAB
AFP: 1.8 UG/L
ALBUMIN SERPL-MCNC: 3.5 G/DL (ref 3.5–5.2)
ALBUMIN/GLOBULIN RATIO: ABNORMAL (ref 1–2.5)
ALP BLD-CCNC: 125 U/L (ref 35–104)
ALT SERPL-CCNC: 133 U/L (ref 5–33)
ANION GAP SERPL CALCULATED.3IONS-SCNC: 10 MMOL/L (ref 9–17)
AST SERPL-CCNC: 68 U/L
BILIRUB SERPL-MCNC: <0.15 MG/DL (ref 0.3–1.2)
BILIRUBIN DIRECT: <0.08 MG/DL
BILIRUBIN, INDIRECT: ABNORMAL MG/DL (ref 0–1)
BUN BLDV-MCNC: 15 MG/DL (ref 8–23)
BUN/CREAT BLD: NORMAL (ref 9–20)
CA 19-9: 11 U/ML (ref 0–35)
CALCIUM SERPL-MCNC: 8.9 MG/DL (ref 8.6–10.4)
CARCINOEMBRYONIC ANTIGEN: 1.5 NG/ML
CHLORIDE BLD-SCNC: 106 MMOL/L (ref 98–107)
CO2: 25 MMOL/L (ref 20–31)
CREAT SERPL-MCNC: 0.54 MG/DL (ref 0.5–0.9)
CULTURE: ABNORMAL
GFR AFRICAN AMERICAN: >60 ML/MIN
GFR NON-AFRICAN AMERICAN: >60 ML/MIN
GFR SERPL CREATININE-BSD FRML MDRD: NORMAL ML/MIN/{1.73_M2}
GFR SERPL CREATININE-BSD FRML MDRD: NORMAL ML/MIN/{1.73_M2}
GLOBULIN: ABNORMAL G/DL (ref 1.5–3.8)
GLUCOSE BLD-MCNC: 90 MG/DL (ref 70–99)
HCT VFR BLD CALC: 31.3 % (ref 36–46)
HEMOGLOBIN: 10.4 G/DL (ref 12–16)
Lab: ABNORMAL
MCH RBC QN AUTO: 30 PG (ref 26–34)
MCHC RBC AUTO-ENTMCNC: 33.3 G/DL (ref 31–37)
MCV RBC AUTO: 90.2 FL (ref 80–100)
NRBC AUTOMATED: ABNORMAL PER 100 WBC
PDW BLD-RTO: 15.2 % (ref 11.5–14.9)
PLATELET # BLD: 254 K/UL (ref 150–450)
PMV BLD AUTO: 8.1 FL (ref 6–12)
POTASSIUM SERPL-SCNC: 4.2 MMOL/L (ref 3.7–5.3)
RBC # BLD: 3.47 M/UL (ref 4–5.2)
SODIUM BLD-SCNC: 141 MMOL/L (ref 135–144)
SPECIMEN DESCRIPTION: ABNORMAL
TOTAL PROTEIN: 6.8 G/DL (ref 6.4–8.3)
WBC # BLD: 5.7 K/UL (ref 3.5–11)

## 2019-02-16 PROCEDURE — 6360000002 HC RX W HCPCS: Performed by: INTERNAL MEDICINE

## 2019-02-16 PROCEDURE — 82105 ALPHA-FETOPROTEIN SERUM: CPT

## 2019-02-16 PROCEDURE — 99232 SBSQ HOSP IP/OBS MODERATE 35: CPT | Performed by: INTERNAL MEDICINE

## 2019-02-16 PROCEDURE — 6370000000 HC RX 637 (ALT 250 FOR IP): Performed by: NURSE PRACTITIONER

## 2019-02-16 PROCEDURE — 2580000003 HC RX 258: Performed by: INTERNAL MEDICINE

## 2019-02-16 PROCEDURE — 36415 COLL VENOUS BLD VENIPUNCTURE: CPT

## 2019-02-16 PROCEDURE — 6360000002 HC RX W HCPCS: Performed by: NURSE PRACTITIONER

## 2019-02-16 PROCEDURE — 6370000000 HC RX 637 (ALT 250 FOR IP): Performed by: INTERNAL MEDICINE

## 2019-02-16 PROCEDURE — 2580000003 HC RX 258: Performed by: NURSE PRACTITIONER

## 2019-02-16 PROCEDURE — 80048 BASIC METABOLIC PNL TOTAL CA: CPT

## 2019-02-16 PROCEDURE — 80076 HEPATIC FUNCTION PANEL: CPT

## 2019-02-16 PROCEDURE — 2060000000 HC ICU INTERMEDIATE R&B

## 2019-02-16 PROCEDURE — 82378 CARCINOEMBRYONIC ANTIGEN: CPT

## 2019-02-16 PROCEDURE — 85027 COMPLETE CBC AUTOMATED: CPT

## 2019-02-16 PROCEDURE — 86301 IMMUNOASSAY TUMOR CA 19-9: CPT

## 2019-02-16 PROCEDURE — 97161 PT EVAL LOW COMPLEX 20 MIN: CPT

## 2019-02-16 PROCEDURE — 99233 SBSQ HOSP IP/OBS HIGH 50: CPT | Performed by: INTERNAL MEDICINE

## 2019-02-16 RX ADMIN — Medication 1 CAPSULE: at 08:56

## 2019-02-16 RX ADMIN — DICLOFENAC 2 G: 10 GEL TOPICAL at 08:56

## 2019-02-16 RX ADMIN — Medication 10 ML: at 21:22

## 2019-02-16 RX ADMIN — CEFTRIAXONE SODIUM 1 G: 1 INJECTION, POWDER, FOR SOLUTION INTRAMUSCULAR; INTRAVENOUS at 17:11

## 2019-02-16 RX ADMIN — DICLOFENAC 2 G: 10 GEL TOPICAL at 21:22

## 2019-02-16 RX ADMIN — TROSPIUM CHLORIDE 20 MG: 20 TABLET, FILM COATED ORAL at 21:22

## 2019-02-16 RX ADMIN — I-VITE, TAB 1000-60-2MG (60/BT) 1 TABLET: TAB at 08:56

## 2019-02-16 RX ADMIN — ENOXAPARIN SODIUM 40 MG: 100 INJECTION SUBCUTANEOUS at 08:55

## 2019-02-16 RX ADMIN — LOSARTAN POTASSIUM 100 MG: 100 TABLET ORAL at 08:56

## 2019-02-16 RX ADMIN — AMLODIPINE BESYLATE 5 MG: 5 TABLET ORAL at 08:56

## 2019-02-16 RX ADMIN — LEVOTHYROXINE SODIUM 25 MCG: 25 TABLET ORAL at 06:13

## 2019-02-16 RX ADMIN — Medication 10 ML: at 08:57

## 2019-02-17 VITALS
BODY MASS INDEX: 27.09 KG/M2 | DIASTOLIC BLOOD PRESSURE: 71 MMHG | RESPIRATION RATE: 16 BRPM | HEIGHT: 60 IN | SYSTOLIC BLOOD PRESSURE: 161 MMHG | HEART RATE: 95 BPM | OXYGEN SATURATION: 96 % | TEMPERATURE: 97.8 F | WEIGHT: 138.01 LBS

## 2019-02-17 LAB
ALBUMIN SERPL-MCNC: 3.9 G/DL (ref 3.5–5.2)
ALBUMIN/GLOBULIN RATIO: ABNORMAL (ref 1–2.5)
ALP BLD-CCNC: 116 U/L (ref 35–104)
ALT SERPL-CCNC: 92 U/L (ref 5–33)
ANION GAP SERPL CALCULATED.3IONS-SCNC: 12 MMOL/L (ref 9–17)
AST SERPL-CCNC: 33 U/L
BILIRUB SERPL-MCNC: 0.18 MG/DL (ref 0.3–1.2)
BILIRUBIN DIRECT: <0.08 MG/DL
BILIRUBIN, INDIRECT: ABNORMAL MG/DL (ref 0–1)
BUN BLDV-MCNC: 16 MG/DL (ref 8–23)
BUN/CREAT BLD: NORMAL (ref 9–20)
CALCIUM SERPL-MCNC: 9.1 MG/DL (ref 8.6–10.4)
CHLORIDE BLD-SCNC: 106 MMOL/L (ref 98–107)
CO2: 24 MMOL/L (ref 20–31)
CREAT SERPL-MCNC: 0.65 MG/DL (ref 0.5–0.9)
GFR AFRICAN AMERICAN: >60 ML/MIN
GFR NON-AFRICAN AMERICAN: >60 ML/MIN
GFR SERPL CREATININE-BSD FRML MDRD: NORMAL ML/MIN/{1.73_M2}
GFR SERPL CREATININE-BSD FRML MDRD: NORMAL ML/MIN/{1.73_M2}
GLOBULIN: ABNORMAL G/DL (ref 1.5–3.8)
GLUCOSE BLD-MCNC: 91 MG/DL (ref 70–99)
HCT VFR BLD CALC: 33.8 % (ref 36–46)
HEMOGLOBIN: 11 G/DL (ref 12–16)
MCH RBC QN AUTO: 29.5 PG (ref 26–34)
MCHC RBC AUTO-ENTMCNC: 32.6 G/DL (ref 31–37)
MCV RBC AUTO: 90.5 FL (ref 80–100)
NRBC AUTOMATED: ABNORMAL PER 100 WBC
PDW BLD-RTO: 15.1 % (ref 11.5–14.9)
PLATELET # BLD: 274 K/UL (ref 150–450)
PMV BLD AUTO: 7.9 FL (ref 6–12)
POTASSIUM SERPL-SCNC: 4.3 MMOL/L (ref 3.7–5.3)
RBC # BLD: 3.73 M/UL (ref 4–5.2)
SODIUM BLD-SCNC: 142 MMOL/L (ref 135–144)
TOTAL PROTEIN: 7.3 G/DL (ref 6.4–8.3)
WBC # BLD: 7.7 K/UL (ref 3.5–11)

## 2019-02-17 PROCEDURE — 99232 SBSQ HOSP IP/OBS MODERATE 35: CPT | Performed by: INTERNAL MEDICINE

## 2019-02-17 PROCEDURE — 36415 COLL VENOUS BLD VENIPUNCTURE: CPT

## 2019-02-17 PROCEDURE — 2580000003 HC RX 258: Performed by: INTERNAL MEDICINE

## 2019-02-17 PROCEDURE — 80048 BASIC METABOLIC PNL TOTAL CA: CPT

## 2019-02-17 PROCEDURE — 80076 HEPATIC FUNCTION PANEL: CPT

## 2019-02-17 PROCEDURE — 99239 HOSP IP/OBS DSCHRG MGMT >30: CPT | Performed by: INTERNAL MEDICINE

## 2019-02-17 PROCEDURE — 85027 COMPLETE CBC AUTOMATED: CPT

## 2019-02-17 PROCEDURE — 6360000002 HC RX W HCPCS: Performed by: INTERNAL MEDICINE

## 2019-02-17 PROCEDURE — 6370000000 HC RX 637 (ALT 250 FOR IP): Performed by: NURSE PRACTITIONER

## 2019-02-17 RX ORDER — CEPHALEXIN 250 MG/1
250 CAPSULE ORAL 3 TIMES DAILY
Qty: 15 CAPSULE | Refills: 0 | Status: SHIPPED | OUTPATIENT
Start: 2019-02-17 | End: 2019-02-22

## 2019-02-17 RX ADMIN — AMLODIPINE BESYLATE 5 MG: 5 TABLET ORAL at 08:00

## 2019-02-17 RX ADMIN — LEVOTHYROXINE SODIUM 25 MCG: 25 TABLET ORAL at 07:27

## 2019-02-17 RX ADMIN — Medication 1 CAPSULE: at 08:00

## 2019-02-17 RX ADMIN — Medication 10 ML: at 08:01

## 2019-02-17 RX ADMIN — ENOXAPARIN SODIUM 40 MG: 100 INJECTION SUBCUTANEOUS at 08:00

## 2019-02-17 RX ADMIN — I-VITE, TAB 1000-60-2MG (60/BT) 1 TABLET: TAB at 08:00

## 2019-02-17 RX ADMIN — LOSARTAN POTASSIUM 100 MG: 100 TABLET ORAL at 08:00

## 2019-02-17 RX ADMIN — DICLOFENAC 2 G: 10 GEL TOPICAL at 08:01

## 2019-02-22 ENCOUNTER — CARE COORDINATION (OUTPATIENT)
Dept: CASE MANAGEMENT | Age: 84
End: 2019-02-22

## 2019-02-22 DIAGNOSIS — R74.8 ELEVATED LIVER ENZYMES: Primary | ICD-10-CM

## 2019-02-22 PROCEDURE — 1111F DSCHRG MED/CURRENT MED MERGE: CPT

## 2019-02-28 ENCOUNTER — CARE COORDINATION (OUTPATIENT)
Dept: CASE MANAGEMENT | Age: 84
End: 2019-02-28

## 2019-03-11 ENCOUNTER — CARE COORDINATION (OUTPATIENT)
Dept: CASE MANAGEMENT | Age: 84
End: 2019-03-11

## 2019-03-30 ENCOUNTER — CARE COORDINATION (OUTPATIENT)
Dept: CASE MANAGEMENT | Age: 84
End: 2019-03-30

## 2019-04-29 ENCOUNTER — CARE COORDINATION (OUTPATIENT)
Dept: CASE MANAGEMENT | Age: 84
End: 2019-04-29

## 2019-05-17 ENCOUNTER — CARE COORDINATION (OUTPATIENT)
Dept: CASE MANAGEMENT | Age: 84
End: 2019-05-17

## (undated) DEVICE — TROCAR: Brand: KII® SLEEVE

## (undated) DEVICE — LUER-LOK 360°: Brand: CONNECTA, LUER-LOK

## (undated) DEVICE — COVER,MAYO STAND,STERILE: Brand: MEDLINE

## (undated) DEVICE — KIT DRN FLAT W/ 100CC EVAC 10MM FULL PERF

## (undated) DEVICE — SOLUTION ANTIFOG VIS SYS CLEARIFY LAPSCP

## (undated) DEVICE — SINGLE PORT MANIFOLD: Brand: NEPTUNE 2

## (undated) DEVICE — PENCIL ES L3M BTTN SWCH HOLSTER W/ BLDE ELECTRD EDGE

## (undated) DEVICE — APPLIER CLP L L13IN TI MULT RNG HNDL 20 CLP STR LIGACLP

## (undated) DEVICE — ELECTRODE LAPAROSCOPIC LHOOK

## (undated) DEVICE — 20 ML SYRINGE LUER-LOCK TIP: Brand: MONOJECT

## (undated) DEVICE — SOLUTION IV IRRIG WATER 1000ML POUR BRL 2F7114

## (undated) DEVICE — DRAPE C ARM W104XL188CM FLD MOB XR

## (undated) DEVICE — DEFENDO AIR WATER SUCTION AND BIOPSY VALVE KIT FOR  OLYMPUS: Brand: DEFENDO AIR/WATER/SUCTION AND BIOPSY VALVE

## (undated) DEVICE — SUTURE PERMAHAND SZ 2-0 L30IN NONABSORBABLE BLK SILK W/O A305H

## (undated) DEVICE — SPONGE DRN W4XL4IN RAYON/POLYESTER 6 PLY NONWOVEN PRECUT

## (undated) DEVICE — FORCEPS BX L240CM WRK CHN 2.8MM STD CAP W/ NDL MIC MESH

## (undated) DEVICE — POOLE SUCTION HANDLE: Brand: CARDINAL HEALTH

## (undated) DEVICE — SPONGE LAP W18XL18IN WHT COT 4 PLY FLD STRUNG RADPQ DISP ST

## (undated) DEVICE — TUBING, SUCTION, 3/16" X 10', STRAIGHT: Brand: MEDLINE

## (undated) DEVICE — TROCARS: Brand: KII® BLUNT TIP ACCESS SYSTEM

## (undated) DEVICE — AIRLIFE™ NASAL OXYGEN CANNULA CURVED, FLARED TIP, WITH 7 FEET (2.1 M) CRUSH RESISTANT TUBING, OVER-THE-EAR STYLE: Brand: AIRLIFE™

## (undated) DEVICE — SOLUTION IV IRRIG POUR BRL 0.9% SODIUM CHL 2F7124

## (undated) DEVICE — SUTURE VCRL + SZ 0 L27IN ABSRB VLT L26MM UR-6 5/8 CIR VCP603H

## (undated) DEVICE — BLADE ES L6IN ELASTOMERIC COAT EXT DURABLE BEND UPTO 90DEG

## (undated) DEVICE — Device: Brand: MEDEX

## (undated) DEVICE — GLOVE ORANGE PI 7   MSG9070

## (undated) DEVICE — SUTURE PDS II SZ 0 L60IN ABSRB VLT L48MM CTX 1/2 CIR Z990G

## (undated) DEVICE — BINDER ABD UNISX 9IN 62IN L AND XL UNIV

## (undated) DEVICE — PMI DISPOSABLE CHOLANGIOGRAPH CATHETER 7", FOR TRADITIONAL OPEN PROCEDURE: Brand: PMI

## (undated) DEVICE — SPONGE: SPECIALTY PEANUT XR 100/CS: Brand: MEDICAL ACTION INDUSTRIES

## (undated) DEVICE — 35 ML SYRINGE LUER-LOCK TIP: Brand: MONOJECT

## (undated) DEVICE — Device

## (undated) DEVICE — BITEBLOCK 54FR W/ DENT RIM BLOX

## (undated) DEVICE — ST CHARLES GEN LAPAROSCOPY PK: Brand: MEDLINE INDUSTRIES, INC.

## (undated) DEVICE — SUTURE PERMAHAND SZ 0 L30IN NONABSORBABLE BLK FSL L30MM 3/8 680H